# Patient Record
Sex: FEMALE | Race: WHITE | NOT HISPANIC OR LATINO
[De-identification: names, ages, dates, MRNs, and addresses within clinical notes are randomized per-mention and may not be internally consistent; named-entity substitution may affect disease eponyms.]

---

## 2019-02-19 ENCOUNTER — TRANSCRIPTION ENCOUNTER (OUTPATIENT)
Age: 29
End: 2019-02-19

## 2019-05-28 ENCOUNTER — LABORATORY RESULT (OUTPATIENT)
Age: 29
End: 2019-05-28

## 2019-05-28 ENCOUNTER — APPOINTMENT (OUTPATIENT)
Dept: PULMONOLOGY | Facility: CLINIC | Age: 29
End: 2019-05-28
Payer: COMMERCIAL

## 2019-05-28 VITALS
BODY MASS INDEX: 22.47 KG/M2 | WEIGHT: 119 LBS | HEIGHT: 61 IN | SYSTOLIC BLOOD PRESSURE: 126 MMHG | TEMPERATURE: 98.1 F | DIASTOLIC BLOOD PRESSURE: 70 MMHG | HEART RATE: 48 BPM | RESPIRATION RATE: 16 BRPM | OXYGEN SATURATION: 99 %

## 2019-05-28 PROCEDURE — 99395 PREV VISIT EST AGE 18-39: CPT | Mod: 25

## 2019-05-28 PROCEDURE — 36415 COLL VENOUS BLD VENIPUNCTURE: CPT

## 2019-05-28 RX ORDER — NORETHINDRONE ACETATE/ETHINYL ESTRADIOL 1.5-0.03MG
1.5-3 KIT ORAL
Refills: 0 | Status: ACTIVE | COMMUNITY
Start: 2019-05-28

## 2019-05-28 RX ORDER — FAMOTIDINE 10 MG/1
10 TABLET, FILM COATED ORAL
Refills: 0 | Status: ACTIVE | COMMUNITY
Start: 2019-05-28

## 2019-05-28 NOTE — PHYSICAL EXAM
[General Appearance - Well Developed] : well developed [General Appearance - Well Nourished] : well nourished [Normal Conjunctiva] : the conjunctiva exhibited no abnormalities [Normal Oropharynx] : normal oropharynx [Jugular Venous Distention Increased] : there was no jugular-venous distention [Thyroid Diffuse Enlargement] : the thyroid was not enlarged [Heart Sounds] : normal S1 and S2 [Murmurs] : no murmurs present [Abdomen Soft] : soft [Abdomen Tenderness] : non-tender [Nail Clubbing] : no clubbing of the fingernails [Cyanosis, Localized] : no localized cyanosis [Skin Color & Pigmentation] : normal skin color and pigmentation [] : no rash [No Venous Stasis] : no venous stasis [Skin Lesions] : no skin lesions [No Skin Ulcers] : no skin ulcer [Deep Tendon Reflexes (DTR)] : deep tendon reflexes were 2+ and symmetric [No Xanthoma] : no  xanthoma was observed [Sensation] : the sensory exam was normal to light touch and pinprick [No Focal Deficits] : no focal deficits

## 2019-05-29 ENCOUNTER — OTHER (OUTPATIENT)
Age: 29
End: 2019-05-29

## 2019-05-29 LAB
ALBUMIN SERPL ELPH-MCNC: 4.3 G/DL
ALP BLD-CCNC: 50 U/L
ALT SERPL-CCNC: 18 U/L
ANION GAP SERPL CALC-SCNC: 10 MMOL/L
AST SERPL-CCNC: 19 U/L
BASOPHILS # BLD AUTO: 0.03 K/UL
BASOPHILS NFR BLD AUTO: 0.4 %
BILIRUB DIRECT SERPL-MCNC: 0.1 MG/DL
BILIRUB INDIRECT SERPL-MCNC: 0.2 MG/DL
BILIRUB SERPL-MCNC: 0.3 MG/DL
BUN SERPL-MCNC: 13 MG/DL
CALCIUM SERPL-MCNC: 9.6 MG/DL
CHLORIDE SERPL-SCNC: 103 MMOL/L
CHOLEST SERPL-MCNC: 141 MG/DL
CHOLEST/HDLC SERPL: 3 RATIO
CO2 SERPL-SCNC: 24 MMOL/L
CREAT SERPL-MCNC: 0.77 MG/DL
EOSINOPHIL # BLD AUTO: 0.09 K/UL
EOSINOPHIL NFR BLD AUTO: 1.3 %
ESTIMATED AVERAGE GLUCOSE: 105 MG/DL
GLUCOSE SERPL-MCNC: 90 MG/DL
HBA1C MFR BLD HPLC: 5.3 %
HCT VFR BLD CALC: 36.7 %
HDLC SERPL-MCNC: 47 MG/DL
HGB BLD-MCNC: 11.3 G/DL
IMM GRANULOCYTES NFR BLD AUTO: 0.3 %
LDLC SERPL CALC-MCNC: 78 MG/DL
LYMPHOCYTES # BLD AUTO: 2.26 K/UL
LYMPHOCYTES NFR BLD AUTO: 31.9 %
MAN DIFF?: NORMAL
MCHC RBC-ENTMCNC: 27.4 PG
MCHC RBC-ENTMCNC: 30.8 GM/DL
MCV RBC AUTO: 89.1 FL
MONOCYTES # BLD AUTO: 0.42 K/UL
MONOCYTES NFR BLD AUTO: 5.9 %
NEUTROPHILS # BLD AUTO: 4.27 K/UL
NEUTROPHILS NFR BLD AUTO: 60.2 %
PLATELET # BLD AUTO: 180 K/UL
POTASSIUM SERPL-SCNC: 3.6 MMOL/L
PROT SERPL-MCNC: 7.1 G/DL
RBC # BLD: 4.12 M/UL
RBC # FLD: 12.6 %
SODIUM SERPL-SCNC: 137 MMOL/L
T3 SERPL-MCNC: 152 NG/DL
T3RU NFR SERPL: 1.1 TBI
T4 FREE SERPL-MCNC: 1.2 NG/DL
T4 SERPL-MCNC: 9.6 UG/DL
TRIGL SERPL-MCNC: 78 MG/DL
TSH SERPL-ACNC: 1.79 UIU/ML
WBC # FLD AUTO: 7.09 K/UL

## 2019-07-02 ENCOUNTER — NON-APPOINTMENT (OUTPATIENT)
Age: 29
End: 2019-07-02

## 2019-07-02 ENCOUNTER — APPOINTMENT (OUTPATIENT)
Dept: PULMONOLOGY | Facility: CLINIC | Age: 29
End: 2019-07-02
Payer: COMMERCIAL

## 2019-07-02 VITALS
HEART RATE: 48 BPM | WEIGHT: 119 LBS | RESPIRATION RATE: 16 BRPM | BODY MASS INDEX: 22.47 KG/M2 | DIASTOLIC BLOOD PRESSURE: 73 MMHG | TEMPERATURE: 98.6 F | OXYGEN SATURATION: 99 % | HEIGHT: 61 IN | SYSTOLIC BLOOD PRESSURE: 115 MMHG

## 2019-07-02 PROCEDURE — 36415 COLL VENOUS BLD VENIPUNCTURE: CPT

## 2019-07-02 PROCEDURE — 93000 ELECTROCARDIOGRAM COMPLETE: CPT

## 2019-07-02 PROCEDURE — 99213 OFFICE O/P EST LOW 20 MIN: CPT | Mod: 25

## 2019-07-02 NOTE — ASSESSMENT
[FreeTextEntry1] : Highly likely vasovagal syncope.\par \par Observation\par \par Maintain hydration

## 2019-07-02 NOTE — HISTORY OF PRESENT ILLNESS
[FreeTextEntry1] : Had a episode of fainting while having alterations of wedding gown, felt like a vasovagal \par Has had vasovagal syncope in past. \par Now feels well. \par \par Exercises regularly without problem

## 2019-07-02 NOTE — PHYSICAL EXAM
[General Appearance - Well Developed] : well developed [General Appearance - Well Nourished] : well nourished [Normal Conjunctiva] : the conjunctiva exhibited no abnormalities [Normal Oropharynx] : normal oropharynx [Jugular Venous Distention Increased] : there was no jugular-venous distention [Thyroid Diffuse Enlargement] : the thyroid was not enlarged [Heart Sounds] : normal S1 and S2 [Murmurs] : no murmurs present [Abdomen Soft] : soft [Abdomen Tenderness] : non-tender [] : no hepato-splenomegaly [Nail Clubbing] : no clubbing of the fingernails [Cyanosis, Localized] : no localized cyanosis [Deep Tendon Reflexes (DTR)] : deep tendon reflexes were 2+ and symmetric [Sensation] : the sensory exam was normal to light touch and pinprick [No Focal Deficits] : no focal deficits

## 2019-07-03 LAB
BASOPHILS # BLD AUTO: 0.02 K/UL
BASOPHILS NFR BLD AUTO: 0.3 %
EOSINOPHIL # BLD AUTO: 0.1 K/UL
EOSINOPHIL NFR BLD AUTO: 1.4 %
FERRITIN SERPL-MCNC: 14 NG/ML
FOLATE SERPL-MCNC: 11.3 NG/ML
HCT VFR BLD CALC: 36.8 %
HGB BLD-MCNC: 11.4 G/DL
IMM GRANULOCYTES NFR BLD AUTO: 0.1 %
IRON SATN MFR SERPL: 15 %
IRON SERPL-MCNC: 72 UG/DL
LYMPHOCYTES # BLD AUTO: 2.27 K/UL
LYMPHOCYTES NFR BLD AUTO: 32.3 %
MAN DIFF?: NORMAL
MCHC RBC-ENTMCNC: 27.7 PG
MCHC RBC-ENTMCNC: 31 GM/DL
MCV RBC AUTO: 89.3 FL
MONOCYTES # BLD AUTO: 0.38 K/UL
MONOCYTES NFR BLD AUTO: 5.4 %
NEUTROPHILS # BLD AUTO: 4.25 K/UL
NEUTROPHILS NFR BLD AUTO: 60.5 %
PLATELET # BLD AUTO: 184 K/UL
RBC # BLD: 4.12 M/UL
RBC # FLD: 13.1 %
TIBC SERPL-MCNC: 469 UG/DL
UIBC SERPL-MCNC: 397 UG/DL
VIT B12 SERPL-MCNC: 462 PG/ML
WBC # FLD AUTO: 7.03 K/UL

## 2020-06-09 ENCOUNTER — TRANSCRIPTION ENCOUNTER (OUTPATIENT)
Age: 30
End: 2020-06-09

## 2020-06-22 ENCOUNTER — TRANSCRIPTION ENCOUNTER (OUTPATIENT)
Age: 30
End: 2020-06-22

## 2020-07-20 ENCOUNTER — APPOINTMENT (OUTPATIENT)
Dept: PULMONOLOGY | Facility: CLINIC | Age: 30
End: 2020-07-20
Payer: COMMERCIAL

## 2020-07-20 VITALS
OXYGEN SATURATION: 98 % | SYSTOLIC BLOOD PRESSURE: 109 MMHG | HEART RATE: 87 BPM | TEMPERATURE: 98.7 F | DIASTOLIC BLOOD PRESSURE: 74 MMHG

## 2020-07-20 PROCEDURE — 99214 OFFICE O/P EST MOD 30 MIN: CPT | Mod: 25

## 2020-07-20 PROCEDURE — 36415 COLL VENOUS BLD VENIPUNCTURE: CPT

## 2020-07-20 NOTE — PHYSICAL EXAM
[No Acute Distress] : no acute distress [Normal Oropharynx] : normal oropharynx [Supple] : supple [Normal Appearance] : normal appearance [No Neck Mass] : no neck mass [No JVD] : no jvd [Normal S1, S2] : normal s1, s2 [No Murmurs] : no murmurs [Clear to Auscultation Bilaterally] : clear to auscultation bilaterally [No Abnormalities] : no abnormalities [Benign] : benign [No Clubbing] : no clubbing [Normal to Percussion] : normal to percussion [No Edema] : no edema

## 2020-07-21 LAB
ALBUMIN SERPL ELPH-MCNC: 4.7 G/DL
ALP BLD-CCNC: 68 U/L
ALT SERPL-CCNC: 28 U/L
ANION GAP SERPL CALC-SCNC: 14 MMOL/L
APTT BLD: 33.6 SEC
AST SERPL-CCNC: 25 U/L
BASOPHILS # BLD AUTO: 0.03 K/UL
BASOPHILS NFR BLD AUTO: 0.5 %
BILIRUB SERPL-MCNC: 0.4 MG/DL
BUN SERPL-MCNC: 12 MG/DL
CALCIUM SERPL-MCNC: 9.5 MG/DL
CHLORIDE SERPL-SCNC: 103 MMOL/L
CO2 SERPL-SCNC: 22 MMOL/L
CREAT SERPL-MCNC: 0.86 MG/DL
EOSINOPHIL # BLD AUTO: 0.06 K/UL
EOSINOPHIL NFR BLD AUTO: 0.9 %
GLUCOSE SERPL-MCNC: 88 MG/DL
HCT VFR BLD CALC: 36.1 %
HGB BLD-MCNC: 11.2 G/DL
IMM GRANULOCYTES NFR BLD AUTO: 0 %
INR PPP: 1.01 RATIO
LYMPHOCYTES # BLD AUTO: 1.92 K/UL
LYMPHOCYTES NFR BLD AUTO: 29.7 %
MAN DIFF?: NORMAL
MCHC RBC-ENTMCNC: 28.3 PG
MCHC RBC-ENTMCNC: 31 GM/DL
MCV RBC AUTO: 91.2 FL
MONOCYTES # BLD AUTO: 0.36 K/UL
MONOCYTES NFR BLD AUTO: 5.6 %
NEUTROPHILS # BLD AUTO: 4.1 K/UL
NEUTROPHILS NFR BLD AUTO: 63.3 %
PLATELET # BLD AUTO: 218 K/UL
POTASSIUM SERPL-SCNC: 4.1 MMOL/L
PROT SERPL-MCNC: 7.4 G/DL
PT BLD: 12 SEC
RBC # BLD: 3.96 M/UL
RBC # FLD: 13 %
SODIUM SERPL-SCNC: 139 MMOL/L
WBC # FLD AUTO: 6.47 K/UL

## 2020-07-21 NOTE — DISCUSSION/SUMMARY
[FreeTextEntry1] : Anemia.\par GERD.\par BRCA positive.\par Preop for bilateral mastectomy and reconstruction.

## 2020-07-21 NOTE — ASSESSMENT
[FreeTextEntry1] : Medical problems as above. Medical status maximized. No medical contraindications to surgery.\par \par Labs drawn in office today\par

## 2020-07-21 NOTE — HISTORY OF PRESENT ILLNESS
[TextBox_4] : Pt feeling well. \par \par No complaints.\par \par For  bilateral mastectomy 8/11/20  Dr. Jessica Heller.

## 2020-11-03 ENCOUNTER — NON-APPOINTMENT (OUTPATIENT)
Age: 30
End: 2020-11-03

## 2020-11-03 ENCOUNTER — APPOINTMENT (OUTPATIENT)
Dept: PULMONOLOGY | Facility: CLINIC | Age: 30
End: 2020-11-03

## 2020-11-03 ENCOUNTER — LABORATORY RESULT (OUTPATIENT)
Age: 30
End: 2020-11-03

## 2020-11-03 ENCOUNTER — APPOINTMENT (OUTPATIENT)
Dept: PULMONOLOGY | Facility: CLINIC | Age: 30
End: 2020-11-03
Payer: COMMERCIAL

## 2020-11-03 VITALS
HEIGHT: 61 IN | WEIGHT: 127 LBS | DIASTOLIC BLOOD PRESSURE: 88 MMHG | TEMPERATURE: 98 F | BODY MASS INDEX: 23.98 KG/M2 | SYSTOLIC BLOOD PRESSURE: 132 MMHG | OXYGEN SATURATION: 99 % | HEART RATE: 102 BPM

## 2020-11-03 VITALS — DIASTOLIC BLOOD PRESSURE: 80 MMHG | SYSTOLIC BLOOD PRESSURE: 124 MMHG

## 2020-11-03 DIAGNOSIS — D64.9 ANEMIA, UNSPECIFIED: ICD-10-CM

## 2020-11-03 DIAGNOSIS — Z15.09 GENETIC SUSCEPTIBILITY TO MALIGNANT NEOPLASM OF BREAST: ICD-10-CM

## 2020-11-03 DIAGNOSIS — Z00.00 ENCOUNTER FOR GENERAL ADULT MEDICAL EXAMINATION W/OUT ABNORMAL FINDINGS: ICD-10-CM

## 2020-11-03 DIAGNOSIS — Z90.89 ACQUIRED ABSENCE OF OTHER ORGANS: ICD-10-CM

## 2020-11-03 DIAGNOSIS — Z23 ENCOUNTER FOR IMMUNIZATION: ICD-10-CM

## 2020-11-03 DIAGNOSIS — R01.1 CARDIAC MURMUR, UNSPECIFIED: ICD-10-CM

## 2020-11-03 DIAGNOSIS — Z15.01 GENETIC SUSCEPTIBILITY TO MALIGNANT NEOPLASM OF BREAST: ICD-10-CM

## 2020-11-03 PROCEDURE — 99072 ADDL SUPL MATRL&STAF TM PHE: CPT

## 2020-11-03 PROCEDURE — 99395 PREV VISIT EST AGE 18-39: CPT | Mod: 25

## 2020-11-03 PROCEDURE — 36415 COLL VENOUS BLD VENIPUNCTURE: CPT

## 2020-11-03 PROCEDURE — 90686 IIV4 VACC NO PRSV 0.5 ML IM: CPT

## 2020-11-03 PROCEDURE — G0008: CPT

## 2020-11-03 PROCEDURE — 93000 ELECTROCARDIOGRAM COMPLETE: CPT

## 2020-11-03 RX ORDER — CABERGOLINE 0.5 MG/1
0.5 TABLET ORAL
Qty: 8 | Refills: 0 | Status: DISCONTINUED | COMMUNITY
Start: 2020-06-26

## 2020-11-03 RX ORDER — FAMOTIDINE 20 MG/1
20 TABLET, FILM COATED ORAL
Qty: 60 | Refills: 0 | Status: DISCONTINUED | COMMUNITY
Start: 2020-05-29

## 2020-11-03 NOTE — PHYSICAL EXAM
[Supple] : supple [No JVD] : no jvd [Normal S1, S2] : normal s1, s2 [Murmur ___ / 6] : murmur [unfilled] / 6 [Clear to Auscultation Bilaterally] : clear to auscultation bilaterally [Normal to Percussion] : normal to percussion [Benign] : benign [No HSM] : no hsm [No Clubbing] : no clubbing [No Cyanosis] : no cyanosis [No Edema] : no edema

## 2020-11-04 PROBLEM — R01.1 HEART MURMUR: Status: ACTIVE | Noted: 2020-11-04

## 2020-11-04 LAB
25(OH)D3 SERPL-MCNC: 24.1 NG/ML
ALBUMIN SERPL ELPH-MCNC: 4.4 G/DL
ALP BLD-CCNC: 67 U/L
ALT SERPL-CCNC: 8 U/L
ANION GAP SERPL CALC-SCNC: 10 MMOL/L
AST SERPL-CCNC: 13 U/L
BASOPHILS # BLD AUTO: 0.01 K/UL
BASOPHILS NFR BLD AUTO: 0.2 %
BILIRUB DIRECT SERPL-MCNC: 0.1 MG/DL
BILIRUB INDIRECT SERPL-MCNC: NORMAL MG/DL
BILIRUB SERPL-MCNC: 0.2 MG/DL
BUN SERPL-MCNC: 7 MG/DL
CALCIUM SERPL-MCNC: 9.4 MG/DL
CHLORIDE SERPL-SCNC: 104 MMOL/L
CHOLEST SERPL-MCNC: 140 MG/DL
CO2 SERPL-SCNC: 22 MMOL/L
CREAT SERPL-MCNC: 0.63 MG/DL
EOSINOPHIL # BLD AUTO: 0.07 K/UL
EOSINOPHIL NFR BLD AUTO: 1.1 %
ESTIMATED AVERAGE GLUCOSE: 111 MG/DL
FERRITIN SERPL-MCNC: 12 NG/ML
FOLATE SERPL-MCNC: 9.4 NG/ML
GLUCOSE SERPL-MCNC: 95 MG/DL
HBA1C MFR BLD HPLC: 5.5 %
HCT VFR BLD CALC: 31.6 %
HCV AB SER QL: NONREACTIVE
HCV S/CO RATIO: 0.09 S/CO
HDLC SERPL-MCNC: 50 MG/DL
HGB BLD-MCNC: 9.7 G/DL
IMM GRANULOCYTES NFR BLD AUTO: 0.2 %
IRON SATN MFR SERPL: 5 %
IRON SERPL-MCNC: 23 UG/DL
LDLC SERPL CALC-MCNC: 77 MG/DL
LYMPHOCYTES # BLD AUTO: 2.26 K/UL
LYMPHOCYTES NFR BLD AUTO: 34.9 %
MAN DIFF?: NORMAL
MCHC RBC-ENTMCNC: 25.9 PG
MCHC RBC-ENTMCNC: 30.7 GM/DL
MCV RBC AUTO: 84.5 FL
MONOCYTES # BLD AUTO: 0.54 K/UL
MONOCYTES NFR BLD AUTO: 8.3 %
NEUTROPHILS # BLD AUTO: 3.59 K/UL
NEUTROPHILS NFR BLD AUTO: 55.3 %
NONHDLC SERPL-MCNC: 91 MG/DL
PLATELET # BLD AUTO: 287 K/UL
POTASSIUM SERPL-SCNC: 4.3 MMOL/L
PROT SERPL-MCNC: 7.2 G/DL
RBC # BLD: 3.74 M/UL
RBC # FLD: 13.9 %
SODIUM SERPL-SCNC: 136 MMOL/L
T3 SERPL-MCNC: 190 NG/DL
T3RU NFR SERPL: 1.3 TBI
T4 FREE SERPL-MCNC: 1.1 NG/DL
T4 SERPL-MCNC: 9.4 UG/DL
THYROGLOB AB SERPL-ACNC: 20.7 IU/ML
THYROPEROXIDASE AB SERPL IA-ACNC: <10 IU/ML
TIBC SERPL-MCNC: 486 UG/DL
TRIGL SERPL-MCNC: 66 MG/DL
TSH SERPL-ACNC: 2.01 UIU/ML
UIBC SERPL-MCNC: 463 UG/DL
VIT B12 SERPL-MCNC: 739 PG/ML
WBC # FLD AUTO: 6.48 K/UL

## 2020-11-04 NOTE — DISCUSSION/SUMMARY
[FreeTextEntry1] : Anemia.\par GERD.\par BRCA positive.\par Preop for bilateral mastectomy and reconstruction.\par Mild systolic murmur.  May be functional.

## 2020-11-04 NOTE — HISTORY OF PRESENT ILLNESS
[Never] : never [TextBox_4] : had embryo frozen and was told thyroid  TSH was high 3.88\par \par This post bilateral mastectomies.  Getting expanders removed and implants in December\par seeing gi in few weeks notice blood with hard stools\par \par noticed today heart rate elevate with i watch\par \par Feeling generally well.

## 2020-11-05 ENCOUNTER — TRANSCRIPTION ENCOUNTER (OUTPATIENT)
Age: 30
End: 2020-11-05

## 2020-12-14 ENCOUNTER — RESULT REVIEW (OUTPATIENT)
Age: 30
End: 2020-12-14

## 2021-04-14 ENCOUNTER — TRANSCRIPTION ENCOUNTER (OUTPATIENT)
Age: 31
End: 2021-04-14

## 2021-05-07 ENCOUNTER — TRANSCRIPTION ENCOUNTER (OUTPATIENT)
Age: 31
End: 2021-05-07

## 2021-08-25 ENCOUNTER — TRANSCRIPTION ENCOUNTER (OUTPATIENT)
Age: 31
End: 2021-08-25

## 2021-09-28 ENCOUNTER — TRANSCRIPTION ENCOUNTER (OUTPATIENT)
Age: 31
End: 2021-09-28

## 2021-10-01 ENCOUNTER — TRANSCRIPTION ENCOUNTER (OUTPATIENT)
Age: 31
End: 2021-10-01

## 2021-10-21 ENCOUNTER — TRANSCRIPTION ENCOUNTER (OUTPATIENT)
Age: 31
End: 2021-10-21

## 2021-11-16 ENCOUNTER — TRANSCRIPTION ENCOUNTER (OUTPATIENT)
Age: 31
End: 2021-11-16

## 2021-11-22 ENCOUNTER — APPOINTMENT (OUTPATIENT)
Dept: PULMONOLOGY | Facility: CLINIC | Age: 31
End: 2021-11-22

## 2021-11-25 ENCOUNTER — EMERGENCY (EMERGENCY)
Facility: HOSPITAL | Age: 31
LOS: 1 days | Discharge: HOME | End: 2021-11-25
Attending: EMERGENCY MEDICINE
Payer: COMMERCIAL

## 2021-11-25 VITALS
DIASTOLIC BLOOD PRESSURE: 87 MMHG | HEART RATE: 80 BPM | RESPIRATION RATE: 15 BRPM | TEMPERATURE: 98 F | OXYGEN SATURATION: 98 % | SYSTOLIC BLOOD PRESSURE: 136 MMHG

## 2021-11-25 VITALS
HEIGHT: 61 IN | WEIGHT: 130.07 LBS | HEART RATE: 94 BPM | RESPIRATION RATE: 16 BRPM | OXYGEN SATURATION: 100 % | TEMPERATURE: 98 F | DIASTOLIC BLOOD PRESSURE: 91 MMHG | SYSTOLIC BLOOD PRESSURE: 134 MMHG

## 2021-11-25 PROCEDURE — 99284 EMERGENCY DEPT VISIT MOD MDM: CPT | Mod: 25

## 2021-11-25 PROCEDURE — 99284 EMERGENCY DEPT VISIT MOD MDM: CPT

## 2021-11-25 PROCEDURE — 71250 CT THORAX DX C-: CPT | Mod: 26,MA

## 2021-11-25 PROCEDURE — 71250 CT THORAX DX C-: CPT | Mod: MA

## 2021-11-25 RX ORDER — ACETAMINOPHEN 500 MG
975 TABLET ORAL ONCE
Refills: 0 | Status: DISCONTINUED | OUTPATIENT
Start: 2021-11-25 | End: 2021-11-29
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
## 2021-11-25 NOTE — ED PROVIDER NOTE - MUSCULOSKELETAL MINIMAL EXAM
+ttp under left breast. +ttp to left inguinal area. full ROM present. strength 5/5 of both upper and lower extremities.

## 2021-11-25 NOTE — ED PROVIDER NOTE - DISPOSITION TYPE
IV insertion

IV access obtained, via clean sterile technique by inserting 22 gauge catheter at left 
forearm after 2 attempt(s). IV secured properly. No trauma to site. Patient tolerated well. DISCHARGE

## 2021-11-25 NOTE — ED ADULT NURSE NOTE - CHPI ED NUR SYMPTOMS NEG
no acting out behaviors/no back pain/no bruising/no decreased eating/drinking/no difficulty bearing weight/no disorientation/no dizziness/no headache/no loss of consciousness/no neck tenderness
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
## 2021-11-25 NOTE — ED PROVIDER NOTE - PATIENT PORTAL LINK FT
You can access the FollowMyHealth Patient Portal offered by [facility] by registering at the following website: [email or web address]. By joining [facility]’s FollowMyHealth portal, you will also be able to view your health information using other applications (apps) compatible with our system.

## 2021-11-25 NOTE — ED ADULT NURSE NOTE - NSIMPLEMENTINTERV_GEN_ALL_ED
Implemented All Universal Safety Interventions:  [place] to call system. Call bell, personal items and telephone within reach. Instruct patient to call for assistance. Room bathroom lighting operational. Non-slip footwear when patient is off stretcher. Physically safe environment: no spills, clutter or unnecessary equipment. Stretcher in lowest position, wheels locked, appropriate side rails in place.

## 2021-12-27 ENCOUNTER — TRANSCRIPTION ENCOUNTER (OUTPATIENT)
Age: 31
End: 2021-12-27

## 2022-01-08 ENCOUNTER — TRANSCRIPTION ENCOUNTER (OUTPATIENT)
Age: 32
End: 2022-01-08

## 2022-01-18 ENCOUNTER — APPOINTMENT (OUTPATIENT)
Dept: PULMONOLOGY | Facility: CLINIC | Age: 32
End: 2022-01-18

## 2022-02-09 ENCOUNTER — TRANSCRIPTION ENCOUNTER (OUTPATIENT)
Age: 32
End: 2022-02-09

## 2022-03-01 ENCOUNTER — APPOINTMENT (OUTPATIENT)
Dept: GASTROENTEROLOGY | Facility: CLINIC | Age: 32
End: 2022-03-01
Payer: COMMERCIAL

## 2022-03-01 ENCOUNTER — NON-APPOINTMENT (OUTPATIENT)
Age: 32
End: 2022-03-01

## 2022-03-01 VITALS
HEART RATE: 95 BPM | TEMPERATURE: 97.6 F | WEIGHT: 135 LBS | SYSTOLIC BLOOD PRESSURE: 110 MMHG | DIASTOLIC BLOOD PRESSURE: 70 MMHG | BODY MASS INDEX: 25.49 KG/M2 | OXYGEN SATURATION: 99 % | HEIGHT: 61 IN

## 2022-03-01 DIAGNOSIS — Z80.41 FAMILY HISTORY OF MALIGNANT NEOPLASM OF OVARY: ICD-10-CM

## 2022-03-01 DIAGNOSIS — Q39.8 OTHER CONGENITAL MALFORMATIONS OF ESOPHAGUS: ICD-10-CM

## 2022-03-01 DIAGNOSIS — Z78.9 OTHER SPECIFIED HEALTH STATUS: ICD-10-CM

## 2022-03-01 DIAGNOSIS — Z80.3 FAMILY HISTORY OF MALIGNANT NEOPLASM OF BREAST: ICD-10-CM

## 2022-03-01 DIAGNOSIS — Z84.81 FAMILY HISTORY OF CARRIER OF GENETIC DISEASE: ICD-10-CM

## 2022-03-01 PROCEDURE — 99204 OFFICE O/P NEW MOD 45 MIN: CPT

## 2022-03-01 RX ORDER — CLOTRIMAZOLE 10 MG/G
1 CREAM TOPICAL
Qty: 15 | Refills: 0 | Status: ACTIVE | COMMUNITY
Start: 2021-11-11

## 2022-03-01 RX ORDER — MUPIROCIN 20 MG/G
2 OINTMENT TOPICAL
Qty: 22 | Refills: 0 | Status: ACTIVE | COMMUNITY
Start: 2021-11-11

## 2022-03-01 RX ORDER — RABEPRAZOLE SODIUM 20 MG/1
20 TABLET, DELAYED RELEASE ORAL
Qty: 180 | Refills: 0 | Status: ACTIVE | COMMUNITY
Start: 2022-02-03

## 2022-03-01 RX ORDER — HYDROCORTISONE 25 MG/ML
2.5 LOTION TOPICAL
Qty: 59 | Refills: 0 | Status: ACTIVE | COMMUNITY
Start: 2021-11-26

## 2022-03-01 NOTE — HISTORY OF PRESENT ILLNESS
[FreeTextEntry1] : 30 yo F pmh BRCA with fam h/o ovarian, breast cancers s/p b/l mastectomy who presents for 2nd opinion re: GERD despite therapy.\par \par GERD:\par Started 10 years ago with dietary issues in college - etoh, trigger foods\par Has been going on for years and had progressed\par It has worsened over the past few years - specifically harder to control with meds\par More trigger foods then in the past, there is significant amount of symptoms \par No dysphagia, odynophagia, n/v\par \par If she were off meds - she would have 100% symptoms daily\par Felt horrible during ph Study\par \par Ultimately saw multiple MDs - EGD, GES, pH study off therapy, manometry\par \par Since e-mano and pH study:\par - Put on Rabeprazole BID + Pepcid\par - Did not get better 100% - Burning is 100% better; 50% on other symptoms\par - Still feels like liquid coming up (not regurgitation), still with belching\par - Did not have scleroderma\par \par \par -------------------------------------------------\par Prior Workup:\par \par E-mano 7/2021 - Yokasta Jones\par Ineffective Esophageal motility - 60% failed, 40% weak\par IRP 5.9\par 100% incomplete bolus clearance\par Ineffective uprights\par Questionable adequate peristaltic reserve\par \par pH Impedance OFF therapy\par AET 12.6%\par 195 total reflux episodes\par + Heartburn SAP/SI correlation\par Positive for GERD\par \par EGD 12/2020 - Marcy Menjivar\par Normal Endoscopy other than some irritation\par \par GES - 2017 - Normal\par Repeat\par \par EGD 2017\par Grade A esophagitis\par \par Colon 2017:\par No polyps found\par \par VCE 2017:\par Normal\par \par \par CT Chest 2021:\par PROCEDURE:\par CT of the Chest was performed.\par Sagittal and coronal reformats were performed.\par \par FINDINGS:\par \par LUNGS AND AIRWAYS: Patent central airways. Lungs are clear.\par PLEURA: No pleural effusion.\par MEDIASTINUM AND JANA: No lymphadenopathy.\par VESSELS: Within normal limits.\par HEART: Heart size is normal. No pericardial effusion.\par CHEST WALL AND LOWER NECK: Bilateral retropectoral implants.\par VISUALIZED UPPER ABDOMEN: Subcentimeter hypoattenuating lesion in the dome of the liver, too small to characterize accurately.\par BONES: Within normal limits. No acute fractures\par \par IMPRESSION:\par No acute thoracic pathology.\par \par \par Pulm 2020\par Encounter for preventive health examination (V70.0) (Z00.00)\par BRCA1 gene mutation positive (V84.01) (Z15.01,Z15.09)\par History of bilateral mastoidectomy (V45.89) (Z90.89)\par Encounter for vaccination (V05.9) (Z23)\par Anemia (285.9) (D64.9)\par \par Labs drawn in office today\par GI evaluation.\par Recheck thyroid function.

## 2022-03-01 NOTE — ASSESSMENT
[FreeTextEntry1] : 32 yo F pmh BRCA with fam h/o ovarian, breast cancers s/p b/l mastectomy who presents for 2nd opinion re: GERD despite therapy. She has IEM with a positive pH study and excellent symptom correlation OFF therapy.  However with persistent symptoms when on.  Next step is repeat mano and pH testing ON therapy.  Will r/o scleroderma via labs as well given frequent aperistalsis on study.   Pending these testing - consideration for promotility agents and/or treatment of functional overlap can be considered.  If persistent reflux - surgical options are likely to be of benefit.\par \par Impression:\par 1) GERD despite therapy\par 2) IEM with frequent aperistalsis\par 3) BRCA with fam h/o ovarian, breast cancers\par 4) s/p Mastectomy\par \par Plan:\par 1) Emano + 24 hour pH study ON THERAPY\par 2) Pending above to consider further treatments for IEM\par 3) Scleroderma labs\par 4) Repeat GES (she is already scheduled)\par 5) Extensive review of multiple old tests with patient.  Many questions answered.  Plan agreed upon\par 6) RV pending above

## 2022-03-01 NOTE — PHYSICAL EXAM
[General Appearance - Alert] : alert [General Appearance - In No Acute Distress] : in no acute distress [General Appearance - Well Nourished] : well nourished [Sclera] : the sclera and conjunctiva were normal [Extraocular Movements] : extraocular movements were intact [Outer Ear] : the ears and nose were normal in appearance [Hearing Threshold Finger Rub Not [place]] : hearing was normal [Neck Appearance] : the appearance of the neck was normal [Neck Cervical Mass (___cm)] : no neck mass was observed [Exaggerated Use Of Accessory Muscles For Inspiration] : no accessory muscle use [Respiration, Rhythm And Depth] : normal respiratory rhythm and effort [Auscultation Breath Sounds / Voice Sounds] : lungs were clear to auscultation bilaterally [Heart Rate And Rhythm] : heart rate was normal and rhythm regular [Heart Sounds] : normal S1 and S2 [Murmurs] : no murmurs [Bowel Sounds] : normal bowel sounds [Abdomen Soft] : soft [Abdomen Tenderness] : non-tender [Abdomen Mass (___ Cm)] : no abdominal mass palpated [Cervical Lymph Nodes Enlarged Posterior Bilaterally] : posterior cervical [Cervical Lymph Nodes Enlarged Anterior Bilaterally] : anterior cervical [Supraclavicular Lymph Nodes Enlarged Bilaterally] : supraclavicular [No CVA Tenderness] : no ~M costovertebral angle tenderness [No Spinal Tenderness] : no spinal tenderness [Abnormal Walk] : normal gait [Involuntary Movements] : no involuntary movements were seen [] : no rash [Skin Lesions] : no skin lesions [No Focal Deficits] : no focal deficits [FreeTextEntry1] : aox3 [Impaired Insight] : insight and judgment were intact [Affect] : the affect was normal

## 2022-03-02 ENCOUNTER — TRANSCRIPTION ENCOUNTER (OUTPATIENT)
Age: 32
End: 2022-03-02

## 2022-03-02 PROBLEM — Z90.13 ACQUIRED ABSENCE OF BILATERAL BREASTS AND NIPPLES: Chronic | Status: ACTIVE | Noted: 2021-11-25

## 2022-03-04 ENCOUNTER — TRANSCRIPTION ENCOUNTER (OUTPATIENT)
Age: 32
End: 2022-03-04

## 2022-03-07 ENCOUNTER — NON-APPOINTMENT (OUTPATIENT)
Age: 32
End: 2022-03-07

## 2022-03-22 ENCOUNTER — TRANSCRIPTION ENCOUNTER (OUTPATIENT)
Age: 32
End: 2022-03-22

## 2022-03-23 ENCOUNTER — TRANSCRIPTION ENCOUNTER (OUTPATIENT)
Age: 32
End: 2022-03-23

## 2022-03-23 LAB
ALBUMIN SERPL ELPH-MCNC: 4.4 G/DL
ALP BLD-CCNC: 75 U/L
ALT SERPL-CCNC: 13 U/L
ANION GAP SERPL CALC-SCNC: 12 MMOL/L
AST SERPL-CCNC: 17 U/L
BASOPHILS # BLD AUTO: 0.02 K/UL
BASOPHILS NFR BLD AUTO: 0.3 %
BILIRUB SERPL-MCNC: 0.2 MG/DL
BUN SERPL-MCNC: 10 MG/DL
CALCIUM SERPL-MCNC: 9.2 MG/DL
CHLORIDE SERPL-SCNC: 105 MMOL/L
CO2 SERPL-SCNC: 21 MMOL/L
CREAT SERPL-MCNC: 0.75 MG/DL
EGFR: 110 ML/MIN/1.73M2
EOSINOPHIL # BLD AUTO: 0.06 K/UL
EOSINOPHIL NFR BLD AUTO: 1 %
ERYTHROCYTE [SEDIMENTATION RATE] IN BLOOD BY WESTERGREN METHOD: 29 MM/HR
GLUCOSE SERPL-MCNC: 69 MG/DL
HCT VFR BLD CALC: 34.9 %
HGB BLD-MCNC: 10.8 G/DL
IMM GRANULOCYTES NFR BLD AUTO: 0.3 %
LYMPHOCYTES # BLD AUTO: 2.34 K/UL
LYMPHOCYTES NFR BLD AUTO: 39 %
MAN DIFF?: NORMAL
MCHC RBC-ENTMCNC: 26.9 PG
MCHC RBC-ENTMCNC: 30.9 GM/DL
MCV RBC AUTO: 86.8 FL
MONOCYTES # BLD AUTO: 0.45 K/UL
MONOCYTES NFR BLD AUTO: 7.5 %
NEUTROPHILS # BLD AUTO: 3.11 K/UL
NEUTROPHILS NFR BLD AUTO: 51.9 %
PLATELET # BLD AUTO: 226 K/UL
POTASSIUM SERPL-SCNC: 4.1 MMOL/L
PROT SERPL-MCNC: 7.2 G/DL
RBC # BLD: 4.02 M/UL
RBC # FLD: 12.5 %
RHEUMATOID FACT SER QL: <10 IU/ML
SMOOTH MUSCLE AB SER QL IF: NORMAL
SODIUM SERPL-SCNC: 138 MMOL/L
WBC # FLD AUTO: 6 K/UL

## 2022-03-24 LAB
ANA PAT FLD IF-IMP: ABNORMAL
ANA SER IF-ACNC: ABNORMAL
CENTROMERE IGG SER-ACNC: <0.2 CD:130001892
ENA SCL70 IGG SER IA-ACNC: <0.2 AL

## 2022-04-04 ENCOUNTER — TRANSCRIPTION ENCOUNTER (OUTPATIENT)
Age: 32
End: 2022-04-04

## 2022-04-06 ENCOUNTER — APPOINTMENT (OUTPATIENT)
Dept: GASTROENTEROLOGY | Facility: HOSPITAL | Age: 32
End: 2022-04-06

## 2022-04-15 ENCOUNTER — TRANSCRIPTION ENCOUNTER (OUTPATIENT)
Age: 32
End: 2022-04-15

## 2022-04-20 ENCOUNTER — TRANSCRIPTION ENCOUNTER (OUTPATIENT)
Age: 32
End: 2022-04-20

## 2022-04-27 ENCOUNTER — NON-APPOINTMENT (OUTPATIENT)
Age: 32
End: 2022-04-27

## 2022-04-27 DIAGNOSIS — Z01.812 ENCOUNTER FOR PREPROCEDURAL LABORATORY EXAMINATION: ICD-10-CM

## 2022-05-04 ENCOUNTER — TRANSCRIPTION ENCOUNTER (OUTPATIENT)
Age: 32
End: 2022-05-04

## 2022-05-04 ENCOUNTER — OUTPATIENT (OUTPATIENT)
Dept: OUTPATIENT SERVICES | Facility: HOSPITAL | Age: 32
LOS: 1 days | End: 2022-05-04
Payer: COMMERCIAL

## 2022-05-04 ENCOUNTER — APPOINTMENT (OUTPATIENT)
Dept: GASTROENTEROLOGY | Facility: HOSPITAL | Age: 32
End: 2022-05-04
Payer: COMMERCIAL

## 2022-05-04 VITALS
HEIGHT: 61 IN | SYSTOLIC BLOOD PRESSURE: 147 MMHG | HEART RATE: 91 BPM | RESPIRATION RATE: 16 BRPM | TEMPERATURE: 98 F | DIASTOLIC BLOOD PRESSURE: 82 MMHG | OXYGEN SATURATION: 98 % | WEIGHT: 130.07 LBS

## 2022-05-04 DIAGNOSIS — K21.9 GASTRO-ESOPHAGEAL REFLUX DISEASE WITHOUT ESOPHAGITIS: ICD-10-CM

## 2022-05-04 DIAGNOSIS — K22.4 DYSKINESIA OF ESOPHAGUS: ICD-10-CM

## 2022-05-04 PROCEDURE — 91010 ESOPHAGUS MOTILITY STUDY: CPT

## 2022-05-04 PROCEDURE — 91010 ESOPHAGUS MOTILITY STUDY: CPT | Mod: 26

## 2022-05-04 PROCEDURE — 91037 ESOPH IMPED FUNCTION TEST: CPT | Mod: 26

## 2022-05-04 PROCEDURE — 91037 ESOPH IMPED FUNCTION TEST: CPT

## 2022-05-04 PROCEDURE — C1889: CPT

## 2022-05-04 DEVICE — CATH VERSAFLEX Z PH: Type: IMPLANTABLE DEVICE | Status: FUNCTIONAL

## 2022-05-04 RX ORDER — NORETHINDRONE AND ETHINYL ESTRADIOL 0.4-0.035
1 KIT ORAL
Qty: 0 | Refills: 0 | DISCHARGE

## 2022-05-04 RX ORDER — RABEPRAZOLE 20 MG/1
20 TABLET, DELAYED RELEASE ORAL
Qty: 0 | Refills: 0 | DISCHARGE

## 2022-05-04 NOTE — ASU PATIENT PROFILE, ADULT - NS TRANSFER DISPOSITION PATIENT BELONGINGS
Patient called stating [facility] had a [facility] and he would like this sent to a new pharmacy, [facility] in [place]. Please advise.    with patient

## 2022-05-04 NOTE — PRE PROCEDURE NOTE - PRE PROCEDURE EVALUATION
Attending Physician:              Tano Fernandes MD MSEd  Performing provider:            SANDRA Reynolds  Indication for Procedure:     GERD, IEM                PAST MEDICAL & SURGICAL HISTORY:  History of bilateral mastectomy          See Allscripts Note for further details  ALLERGIES:  No Known Allergies    HOME MEDICATIONS:  Microgestin 1.5/30 oral tablet: 1 tab(s) orally once a day  RABEprazole 20 mg oral tablet, extended release: 20  orally 2 times a day      See Allscripts Note for further details    AICD/PPM: [ ] yes   [X ] no    PERTINENT LAB DATA:                      PHYSICAL EXAMINATION:    Height (cm): 154.9  Weight (kg): 59  BMI (kg/m2): 24.6  BSA (m2): 1.57T(C): --  HR: --  BP: --  RR: --  SpO2: --    Constitutional: NAD  HEENT: PERRLA, EOMI,    Neck:  No JVD  Respiratory: CTAB/L  Cardiovascular: S1 and S2  Gastrointestinal: BS+, soft, NT/ND  Extremities: No peripheral edema  Neurological: A/O x 3, no focal deficits  Psychiatric: Normal mood, normal affect  Skin: No rashes    ASA Class: I [ ]  II [X ]  III [ ]  IV [ ]    COMMENTS:    The patient is a suitable candidate for the planned procedure unless box checked [ ]  No, explain:

## 2022-05-04 NOTE — ASU PATIENT PROFILE, ADULT - FALL HARM RISK - UNIVERSAL INTERVENTIONS
Bed in lowest position, wheels locked, appropriate side rails in place/Call bell, personal items and telephone in reach/Instruct patient to call for assistance before getting out of bed or chair/Non-slip footwear when patient is out of bed/[place] to call system/Physically safe environment - no spills, clutter or unnecessary equipment/Purposeful Proactive Rounding/Room/bathroom lighting operational, light cord in reach

## 2022-05-04 NOTE — ASU DISCHARGE PLAN (ADULT/PEDIATRIC) - NS MD DC FALL RISK RISK
For information on Fall & Injury Prevention, visit: [email or web address] OR  [email or web address] OR  https://www.cdc.gov/steadi/patient.html

## 2022-05-05 ENCOUNTER — NON-APPOINTMENT (OUTPATIENT)
Age: 32
End: 2022-05-05

## 2022-05-05 ENCOUNTER — TRANSCRIPTION ENCOUNTER (OUTPATIENT)
Age: 32
End: 2022-05-05

## 2022-05-05 PROCEDURE — 91038 ESOPH IMPED FUNCT TEST > 1HR: CPT | Mod: 26

## 2022-05-10 ENCOUNTER — TRANSCRIPTION ENCOUNTER (OUTPATIENT)
Age: 32
End: 2022-05-10

## 2022-05-13 ENCOUNTER — TRANSCRIPTION ENCOUNTER (OUTPATIENT)
Age: 32
End: 2022-05-13

## 2022-05-17 ENCOUNTER — NON-APPOINTMENT (OUTPATIENT)
Age: 32
End: 2022-05-17

## 2022-05-17 ENCOUNTER — TRANSCRIPTION ENCOUNTER (OUTPATIENT)
Age: 32
End: 2022-05-17

## 2022-06-09 ENCOUNTER — APPOINTMENT (OUTPATIENT)
Dept: GASTROENTEROLOGY | Facility: CLINIC | Age: 32
End: 2022-06-09
Payer: COMMERCIAL

## 2022-06-09 DIAGNOSIS — K22.4 DYSKINESIA OF ESOPHAGUS: ICD-10-CM

## 2022-06-09 DIAGNOSIS — K21.9 GASTRO-ESOPHAGEAL REFLUX DISEASE W/OUT ESOPHAGITIS: ICD-10-CM

## 2022-06-09 PROCEDURE — 99215 OFFICE O/P EST HI 40 MIN: CPT | Mod: 95

## 2022-06-10 ENCOUNTER — TRANSCRIPTION ENCOUNTER (OUTPATIENT)
Age: 32
End: 2022-06-10

## 2022-06-14 PROBLEM — K22.4 INEFFECTIVE ESOPHAGEAL MOTILITY: Status: ACTIVE | Noted: 2022-03-01

## 2022-06-14 PROBLEM — K21.9 GERD (GASTROESOPHAGEAL REFLUX DISEASE): Status: ACTIVE | Noted: 2019-05-28

## 2022-06-14 NOTE — ASSESSMENT
[FreeTextEntry1] : 32 yo F pmh BRCA with fam h/o ovarian, breast cancers s/p b/l mastectomy who presents for follow up for GERD despite therapy.  Still with IEM, Increased weak acid reflux events with excellent symptom correlation on BID PPI.  Labs do not suggest scleroderma, though there is a + CECY.  Ultimately while patient is doing reasonably well symptom wise at the current time, they may be a good candidate for barrier procedure.  Given IEM and no reported hernia (EGD last year, mano this year), may be a good candidate for TIFF consideration rather than fundoplication.  She is more interested in this as well.\par \par Impression:\par 1) GERD despite therapy -> persistent + weak acid reflux events with +SAP/SI\par 2) IEM with frequent aperistalsis (30% on recent study)\par 3) BRCA with fam h/o ovarian, breast cancers\par 4) s/p Mastectomy\par 5) + CECY\par \par Plan:\par 1) Referral for TIF consideration. Will also discuss at swallow conference\par 2) To follow with PCP for + CECY\par 3) C/w BID PPI at this time as doing well\par 4) Need to get GES records\par 5) Extensive review of recent tests with patient. Many questions answered. Plan agreed upon\par 6) RV pending above.

## 2022-06-14 NOTE — HISTORY OF PRESENT ILLNESS
[FreeTextEntry1] : Last visit: 3/2022\par Plan after last visit:\par Inlet patch of esophagus (750.4) (Q39.8)\par GERD (gastroesophageal reflux disease) (530.81) (K21.9)\par Ineffective esophageal motility (530.5) (K22.4)\par \par 32 yo F pmh BRCA with fam h/o ovarian, breast cancers s/p b/l mastectomy who presents for 2nd opinion re: GERD despite therapy. She has IEM with a positive pH study and excellent symptom correlation OFF therapy. However with persistent symptoms when on. Next step is repeat mano and pH testing ON therapy. Will r/o scleroderma via labs as well given frequent aperistalsis on study. Pending these testing - consideration for promotility agents and/or treatment of functional overlap can be considered. If persistent reflux - surgical options are likely to be of benefit.\par \par Impression:\par 1) GERD despite therapy\par 2) IEM with frequent aperistalsis\par 3) BRCA with fam h/o ovarian, breast cancers\par 4) s/p Mastectomy\par \par Plan:\par 1) Emano + 24 hour pH study ON THERAPY\par 2) Pending above to consider further treatments for IEM\par 3) Scleroderma labs\par 4) Repeat GES (she is already scheduled)\par 5) Extensive review of multiple old tests with patient. Many questions answered. Plan agreed upon\par 6) RV pending above. \par -------------------------------------------------------------------------------\par \par Since last visit:\par - Had mano, ph, and labs\par \par Currently:\par Still on Rabeprazole 20 mg BID\par Symptoms continue to be relatively well controlled on this regimen, but does feel breakthrough symptoms\par Belching, burning still presenting post prandially and noticeable\par About 2x/week may take a prn h2RA\par No true dysphagia, odynophagia, n/v\par Interested in considering barrier procedures\par \par -------------------------------------------------\par Prior Workup:\par \par Labs 3/2022\par + CECY\par Negative other scleroderma labs\par \par E-mano - Dena - 5/2022 - ON BID PPI\par +IEM\par 30% failed, 70% ineffective\par Limited peristaltic reserve\par \par pH Impedance ON BID THERAPY - 5/2022\par Negative AET\par Increased Weak reflux episdes\par + SAP, + SI to weak reflux\par \par \par E-mano 7/2021 - [facility] Robert\par Ineffective Esophageal motility - 60% failed, 40% weak\par IRP 5.9\par 100% incomplete bolus clearance\par Ineffective uprights\par Questionable adequate peristaltic reserve\par \par pH Impedance OFF therapy\par AET 12.6%\par 195 total reflux episodes\par + Heartburn SAP/SI correlation\par Positive for GERD\par \par EGD 12/2020 - Marcy Menjivar\par Normal Endoscopy other than some irritation\par \par GES - 2017 - Normal\par Repeat\par \par EGD 2017\par Grade A esophagitis\par \par Colon 2017:\par No polyps found\par \par VCE 2017:\par Normal\par \par \par CT Chest 2021:\par PROCEDURE:\par CT of the Chest was performed.\par Sagittal and coronal reformats were performed.\par \par FINDINGS:\par \par LUNGS AND AIRWAYS: Patent central airways. Lungs are clear.\par PLEURA: No pleural effusion.\par MEDIASTINUM AND JANA: No lymphadenopathy.\par VESSELS: Within normal limits.\par HEART: Heart size is normal. No pericardial effusion.\par CHEST WALL AND LOWER NECK: Bilateral retropectoral implants.\par VISUALIZED UPPER ABDOMEN: Subcentimeter hypoattenuating lesion in the dome of the liver, too small to characterize accurately.\par BONES: Within normal limits. No acute fractures\par \par IMPRESSION:\par No acute thoracic pathology.\par \par \par Pulm 2020\par Encounter for preventive health examination (V70.0) (Z00.00)\par BRCA1 gene mutation positive (V84.01) (Z15.01,Z15.09)\par History of bilateral mastoidectomy (V45.89) (Z90.89)\par Encounter for vaccination (V05.9) (Z23)\par Anemia (285.9) (D64.9)\par \par Labs drawn in office today\par GI evaluation.\par Recheck thyroid function. \par

## 2022-06-14 NOTE — PHYSICAL EXAM
[General Appearance - Alert] : alert [General Appearance - Well Nourished] : well nourished [Sclera] : the sclera and conjunctiva were normal [Extraocular Movements] : extraocular movements were intact [Outer Ear] : the ears and nose were normal in appearance [Hearing Threshold Finger Rub Not Aiken] : hearing was normal [No Focal Deficits] : no focal deficits [Impaired Insight] : insight and judgment were intact [Affect] : the affect was normal [FreeTextEntry1] : aox3

## 2022-06-22 ENCOUNTER — NON-APPOINTMENT (OUTPATIENT)
Age: 32
End: 2022-06-22

## 2022-06-28 ENCOUNTER — TRANSCRIPTION ENCOUNTER (OUTPATIENT)
Age: 32
End: 2022-06-28

## 2022-06-29 ENCOUNTER — TRANSCRIPTION ENCOUNTER (OUTPATIENT)
Age: 32
End: 2022-06-29

## 2022-07-01 ENCOUNTER — TRANSCRIPTION ENCOUNTER (OUTPATIENT)
Age: 32
End: 2022-07-01

## 2022-08-02 ENCOUNTER — NON-APPOINTMENT (OUTPATIENT)
Age: 32
End: 2022-08-02

## 2022-08-06 ENCOUNTER — NON-APPOINTMENT (OUTPATIENT)
Age: 32
End: 2022-08-06

## 2022-08-10 ENCOUNTER — APPOINTMENT (OUTPATIENT)
Dept: GASTROENTEROLOGY | Facility: HOSPITAL | Age: 32
End: 2022-08-10

## 2022-09-22 ENCOUNTER — APPOINTMENT (OUTPATIENT)
Dept: GASTROENTEROLOGY | Facility: CLINIC | Age: 32
End: 2022-09-22

## 2022-10-19 NOTE — ED ADULT TRIAGE NOTE - BRAND OF COVID-19 VACCINATION
Detail Level: Detailed Depth Of Biopsy: dermis Was A Bandage Applied: Yes Size Of Lesion In Cm: 0 Biopsy Type: H and E Biopsy Method: Dermablade Anesthesia Type: 1% lidocaine with epinephrine and a 1:10 solution of 8.4% sodium bicarbonate Anesthesia Volume In Cc: 0.5 Hemostasis: Drysol Wound Care: Vaseline Dressing: bandage Destruction After The Procedure: No Type Of Destruction Used: Curettage Curettage Text: The wound bed was treated with curettage after the biopsy was performed. Cryotherapy Text: The wound bed was treated with cryotherapy after the biopsy was performed. Electrodesiccation Text: The wound bed was treated with electrodesiccation after the biopsy was performed. Electrodesiccation And Curettage Text: The wound bed was treated with electrodesiccation and curettage after the biopsy was performed. Silver Nitrate Text: The wound bed was treated with silver nitrate after the biopsy was performed. Lab: [id number] Lab Facility:  Consent: Written consent was obtained and risks were reviewed including but not limited to scarring, infection, bleeding, scabbing, incomplete removal, nerve damage and allergy to anesthesia. Post-Care Instructions: I reviewed with the patient in detail post-care instructions. Patient is to keep the biopsy site dry overnight, and then apply bacitracin twice daily until healed. Patient may apply hydrogen peroxide soaks to remove any crusting. Notification Instructions: Patient will be notified of biopsy results. However, patient instructed to call the office if not contacted within 2 weeks. Billing Type: Third-Party Bill Information: Selecting Yes will display possible errors in your note based on the variables you have selected. This validation is only offered as a suggestion for you. PLEASE NOTE THAT THE VALIDATION TEXT WILL BE REMOVED WHEN YOU FINALIZE YOUR NOTE. IF YOU WANT TO FAX A PRELIMINARY NOTE YOU WILL NEED TO TOGGLE THIS TO 'NO' IF YOU DO NOT WANT IT IN YOUR FAXED NOTE. Moderna dose 1, 2, and 3

## 2022-10-22 ENCOUNTER — NON-APPOINTMENT (OUTPATIENT)
Age: 32
End: 2022-10-22

## 2022-12-11 ENCOUNTER — NON-APPOINTMENT (OUTPATIENT)
Age: 32
End: 2022-12-11

## 2023-08-23 NOTE — ED ADULT TRIAGE NOTE - PATIENT ON (OXYGEN DELIVERY METHOD)
room air Dapsone Pregnancy And Lactation Text: This medication is Pregnancy Category C and is not considered safe during pregnancy or breast feeding.

## 2023-09-09 ENCOUNTER — NON-APPOINTMENT (OUTPATIENT)
Age: 33
End: 2023-09-09

## 2023-09-17 ENCOUNTER — EMERGENCY (EMERGENCY)
Facility: HOSPITAL | Age: 33
LOS: 1 days | Discharge: HOME | End: 2023-09-17
Attending: EMERGENCY MEDICINE | Admitting: EMERGENCY MEDICINE
Payer: COMMERCIAL

## 2023-09-17 VITALS
OXYGEN SATURATION: 100 % | SYSTOLIC BLOOD PRESSURE: 127 MMHG | DIASTOLIC BLOOD PRESSURE: 68 MMHG | RESPIRATION RATE: 18 BRPM | TEMPERATURE: 98 F | HEART RATE: 68 BPM

## 2023-09-17 VITALS
RESPIRATION RATE: 18 BRPM | OXYGEN SATURATION: 100 % | TEMPERATURE: 98 F | SYSTOLIC BLOOD PRESSURE: 128 MMHG | DIASTOLIC BLOOD PRESSURE: 80 MMHG | HEART RATE: 70 BPM

## 2023-09-17 DIAGNOSIS — Z90.13 ACQUIRED ABSENCE OF BILATERAL BREASTS AND NIPPLES: Chronic | ICD-10-CM

## 2023-09-17 LAB
ALBUMIN SERPL ELPH-MCNC: 3.3 G/DL — SIGNIFICANT CHANGE UP (ref 3.3–5)
ALBUMIN SERPL ELPH-MCNC: 4 G/DL — SIGNIFICANT CHANGE UP (ref 3.3–5)
ALP SERPL-CCNC: 86 U/L — SIGNIFICANT CHANGE UP (ref 40–120)
ALP SERPL-CCNC: 98 U/L — SIGNIFICANT CHANGE UP (ref 40–120)
ALT FLD-CCNC: 23 U/L — SIGNIFICANT CHANGE UP (ref 4–33)
ANION GAP SERPL CALC-SCNC: 8 MMOL/L — SIGNIFICANT CHANGE UP (ref 7–14)
ANION GAP SERPL CALC-SCNC: 9 MMOL/L — SIGNIFICANT CHANGE UP (ref 7–14)
APAP SERPL-MCNC: <10 UG/ML — LOW (ref 15–25)
AST SERPL-CCNC: 18 U/L — SIGNIFICANT CHANGE UP (ref 4–32)
AST SERPL-CCNC: 23 U/L — SIGNIFICANT CHANGE UP (ref 4–32)
BASOPHILS # BLD AUTO: 0.03 K/UL — SIGNIFICANT CHANGE UP (ref 0–0.2)
BASOPHILS NFR BLD AUTO: 0.2 % — SIGNIFICANT CHANGE UP (ref 0–2)
BILIRUB SERPL-MCNC: 0.3 MG/DL — SIGNIFICANT CHANGE UP (ref 0.2–1.2)
BUN SERPL-MCNC: 8 MG/DL — SIGNIFICANT CHANGE UP (ref 7–23)
BUN SERPL-MCNC: 9 MG/DL — SIGNIFICANT CHANGE UP (ref 7–23)
CALCIUM SERPL-MCNC: 8.8 MG/DL — SIGNIFICANT CHANGE UP (ref 8.4–10.5)
CALCIUM SERPL-MCNC: 9.5 MG/DL — SIGNIFICANT CHANGE UP (ref 8.4–10.5)
CHLORIDE SERPL-SCNC: 101 MMOL/L — SIGNIFICANT CHANGE UP (ref 98–107)
CHLORIDE SERPL-SCNC: 99 MMOL/L — SIGNIFICANT CHANGE UP (ref 98–107)
CO2 SERPL-SCNC: 23 MMOL/L — SIGNIFICANT CHANGE UP (ref 22–31)
CO2 SERPL-SCNC: 26 MMOL/L — SIGNIFICANT CHANGE UP (ref 22–31)
CREAT SERPL-MCNC: 0.65 MG/DL — SIGNIFICANT CHANGE UP (ref 0.5–1.3)
CREAT SERPL-MCNC: 0.68 MG/DL — SIGNIFICANT CHANGE UP (ref 0.5–1.3)
EGFR: 120 ML/MIN/1.73M2 — SIGNIFICANT CHANGE UP
EGFR: 121 ML/MIN/1.73M2 — SIGNIFICANT CHANGE UP
EOSINOPHIL # BLD AUTO: 0.06 K/UL — SIGNIFICANT CHANGE UP (ref 0–0.5)
EOSINOPHIL NFR BLD AUTO: 0.5 % — SIGNIFICANT CHANGE UP (ref 0–6)
ETHANOL SERPL-MCNC: <10 MG/DL — SIGNIFICANT CHANGE UP
GLUCOSE SERPL-MCNC: 82 MG/DL — SIGNIFICANT CHANGE UP (ref 70–99)
GLUCOSE SERPL-MCNC: 97 MG/DL — SIGNIFICANT CHANGE UP (ref 70–99)
HCG SERPL-ACNC: 27.6 MIU/ML — SIGNIFICANT CHANGE UP
HCT VFR BLD CALC: 34.4 % — LOW (ref 34.5–45)
HGB BLD-MCNC: 11.3 G/DL — LOW (ref 11.5–15.5)
IANC: 9.77 K/UL — HIGH (ref 1.8–7.4)
IMM GRANULOCYTES NFR BLD AUTO: 0.5 % — SIGNIFICANT CHANGE UP (ref 0–0.9)
LIDOCAIN IGE QN: 14 U/L — SIGNIFICANT CHANGE UP (ref 7–60)
LYMPHOCYTES # BLD AUTO: 1.82 K/UL — SIGNIFICANT CHANGE UP (ref 1–3.3)
LYMPHOCYTES # BLD AUTO: 14.8 % — SIGNIFICANT CHANGE UP (ref 13–44)
MCHC RBC-ENTMCNC: 27.8 PG — SIGNIFICANT CHANGE UP (ref 27–34)
MCHC RBC-ENTMCNC: 32.8 GM/DL — SIGNIFICANT CHANGE UP (ref 32–36)
MCV RBC AUTO: 84.7 FL — SIGNIFICANT CHANGE UP (ref 80–100)
MONOCYTES # BLD AUTO: 0.53 K/UL — SIGNIFICANT CHANGE UP (ref 0–0.9)
MONOCYTES NFR BLD AUTO: 4.3 % — SIGNIFICANT CHANGE UP (ref 2–14)
NEUTROPHILS # BLD AUTO: 9.77 K/UL — HIGH (ref 1.8–7.4)
NEUTROPHILS NFR BLD AUTO: 79.7 % — HIGH (ref 43–77)
NRBC # BLD: 0 /100 WBCS — SIGNIFICANT CHANGE UP (ref 0–0)
NRBC # FLD: 0 K/UL — SIGNIFICANT CHANGE UP (ref 0–0)
PLATELET # BLD AUTO: 196 K/UL — SIGNIFICANT CHANGE UP (ref 150–400)
POTASSIUM SERPL-MCNC: 3.9 MMOL/L — SIGNIFICANT CHANGE UP (ref 3.5–5.3)
POTASSIUM SERPL-MCNC: 4.1 MMOL/L — SIGNIFICANT CHANGE UP (ref 3.5–5.3)
POTASSIUM SERPL-SCNC: 3.9 MMOL/L — SIGNIFICANT CHANGE UP (ref 3.5–5.3)
POTASSIUM SERPL-SCNC: 4.1 MMOL/L — SIGNIFICANT CHANGE UP (ref 3.5–5.3)
PROT SERPL-MCNC: 6 G/DL — SIGNIFICANT CHANGE UP (ref 6–8.3)
PROT SERPL-MCNC: 7.4 G/DL — SIGNIFICANT CHANGE UP (ref 6–8.3)
RBC # BLD: 4.06 M/UL — SIGNIFICANT CHANGE UP (ref 3.8–5.2)
RBC # FLD: 12.2 % — SIGNIFICANT CHANGE UP (ref 10.3–14.5)
SALICYLATES SERPL-MCNC: <0.3 MG/DL — LOW (ref 15–30)
SODIUM SERPL-SCNC: 133 MMOL/L — LOW (ref 135–145)
WBC # BLD: 12.27 K/UL — HIGH (ref 3.8–10.5)
WBC # FLD AUTO: 12.27 K/UL — HIGH (ref 3.8–10.5)

## 2023-09-17 PROCEDURE — 93010 ELECTROCARDIOGRAM REPORT: CPT

## 2023-09-17 PROCEDURE — 99285 EMERGENCY DEPT VISIT HI MDM: CPT

## 2023-09-17 PROCEDURE — 74177 CT ABD & PELVIS W/CONTRAST: CPT | Mod: 26,MA

## 2023-09-17 PROCEDURE — 76705 ECHO EXAM OF ABDOMEN: CPT | Mod: 26

## 2023-09-17 RX ORDER — ACETYLCYSTEINE 200 MG/ML
11 VIAL (ML) MISCELLANEOUS ONCE
Refills: 0 | Status: DISCONTINUED | OUTPATIENT
Start: 2023-09-17 | End: 2023-09-17

## 2023-09-17 RX ORDER — ONDANSETRON 8 MG/1
4 TABLET, FILM COATED ORAL ONCE
Refills: 0 | Status: COMPLETED | OUTPATIENT
Start: 2023-09-17 | End: 2023-09-17

## 2023-09-17 RX ORDER — ONDANSETRON 8 MG/1
1 TABLET, FILM COATED ORAL
Qty: 9 | Refills: 0
Start: 2023-09-17 | End: 2023-09-19

## 2023-09-17 RX ORDER — SODIUM CHLORIDE 9 MG/ML
1000 INJECTION INTRAMUSCULAR; INTRAVENOUS; SUBCUTANEOUS ONCE
Refills: 0 | Status: COMPLETED | OUTPATIENT
Start: 2023-09-17 | End: 2023-09-17

## 2023-09-17 RX ADMIN — ONDANSETRON 4 MILLIGRAM(S): 8 TABLET, FILM COATED ORAL at 08:20

## 2023-09-17 RX ADMIN — SODIUM CHLORIDE 1000 MILLILITER(S): 9 INJECTION INTRAMUSCULAR; INTRAVENOUS; SUBCUTANEOUS at 08:20

## 2023-09-17 NOTE — ED PROVIDER NOTE - CLINICAL SUMMARY MEDICAL DECISION MAKING FREE TEXT BOX
Marbella [facility] PGY-3: 31 yo F hx GERD, s/p preventative b/l mastectomy, presenting s/p accidental tylenol overdose. Pt states she had an egg retrieval procedure on Friday 9/16 10 AM, then started to take tylenol 2 pills extra strength (500 mg) every 4 hrs. Misread the time and took more pills than she meant to, took 30 pills of 500 mg tyelnol between 11 AM on Friday (9/16) and 2:45 this AM (9/17), for total 30x500 = 15 g tylenol. +upper abd ttp. Pt well appearing. concern for tyelnol overdose. Labs, ekg, tox      EKG HR 63, NSR, , QRS 88, QTc 413

## 2023-09-17 NOTE — CONSULT NOTE ADULT - ASSESSMENT
31 yo F hx, gerd and mastectomy, who presents with inadvertent chronic acetaminophen overdose, around 15 grams from Friday until this morning. Her most recent dose 2:45 am today. She has baseline abdominal pain from procedure. She has normal vitals, and on exam per ED team had epigastric tenderness. EKG unremarkable. Labs show apap < 10, alt 23, ast 23.    Given patient has an undetectable acetaminphen level with normal LFTs, she is does not have and is not expected to develop hepatotoxicity.     Acetaminophen can cause hepatotoxicity via metabolism by CPY2E1 enzyme into NAPQI, which depletes the liver's glutathione capacity leading to oxidative injury. In massive overdose, nephrotoxicity, metabolic acidosis with lactatemia, and altered mentation could occur. The Andrade normogram allows us to estimate the likelihood of hepatotoxicity in an acute ingestion within the first 24 hours, and guide whether or not to initiate n-acetylcysteine (NAC) therapy. NAC is an antioxidant and detoxified NAQPI.    #Recommendations  - Cleared from acute toxicological standpoint  - Please instruct patient on correct apap dosage  - Further medical and/or psychiatric care per primary team    Thank you for involving us in the care of this patient. Assessment and plan discussed with toxicology attending Dr. Jayden Orta. Please do not hesitate to reach out to the toxicology team for any further questions or concerns.    The On-Call Toxicology Fellow can be reached 24/7 via Pager #[phone]  Please send a 10 digit call back # as [place] cover multiple hospitals    John Oropeza MD  Toxicology Fellow  PGY-5       33 yo F hx, gerd and mastectomy, who presents with inadvertent chronic acetaminophen overdose, around 15 grams from Friday until this morning. Her most recent dose 2:45 am today. She has baseline abdominal pain from procedure. She has normal vitals, and on exam per ED team had epigastric tenderness. EKG unremarkable. Labs show apap < 10, alt 23, ast 23.    Given patient has an undetectable acetaminphen level with normal LFTs, she is does not have and is not expected to develop hepatotoxicity.     Acetaminophen can cause hepatotoxicity via metabolism by CPY2E1 enzyme into NAPQI, which depletes the liver's glutathione capacity leading to oxidative injury. In massive overdose, nephrotoxicity, metabolic acidosis with lactatemia, and altered mentation could occur. The Andrade normogram allows us to estimate the likelihood of hepatotoxicity in an acute ingestion within the first 24 hours, and guide whether or not to initiate n-acetylcysteine (NAC) therapy. NAC is an antioxidant and detoxified NAQPI.    #Recommendations  - Cleared from acute toxicological standpoint  - Please instruct patient on correct apap dosage  - Further medical and/or psychiatric care per primary team    Thank you for involving us in the care of this patient. Assessment and plan discussed with toxicology attending Dr. Jayden Orta. Please do not hesitate to reach out to the toxicology team for any further questions or concerns.    The On-Call Toxicology Fellow can be reached 24/7 via Pager #[phone]  Please send a 10 digit call back # as [place] cover multiple hospitals    John Oropeza MD  Toxicology Fellow  PGY-5       31 yo F hx, gerd and mastectomy, who presents with inadvertent chronic acetaminophen overdose, around 15 grams from Friday until this morning. Her most recent dose 2:45 am today. She has baseline abdominal pain from procedure. She has normal vitals, and on exam per ED team had epigastric tenderness. EKG unremarkable. Labs show apap < 10, alt 23, ast 23.    Given patient has an undetectable acetaminphen level with normal LFTs, she is does not have and is not expected to develop hepatotoxicity.     Acetaminophen can cause hepatotoxicity via metabolism by CPY2E1 enzyme into NAPQI, which depletes the liver's glutathione capacity leading to oxidative injury. In massive overdose, nephrotoxicity, metabolic acidosis with lactatemia, and altered mentation could occur. The Andrade normogram allows us to estimate the likelihood of hepatotoxicity in an acute ingestion within the first 24 hours, and guide whether or not to initiate n-acetylcysteine (NAC) therapy. NAC is an antioxidant and detoxified NAQPI.    #Recommendations  - Cleared from acute toxicological standpoint  - Please instruct patient on correct apap dosage  - Further medical and/or psychiatric care per primary team    Thank you for involving us in the care of this patient. Assessment and plan discussed with toxicology attending Dr. Jayden Orta. Please do not hesitate to reach out to the toxicology team for any further questions or concerns.    The On-Call Toxicology Fellow can be reached 24/7 via Pager #[phone]  Please send a 10 digit call back # as [place] cover multiple hospitals    John Oropeza MD  Toxicology Fellow  PGY-5

## 2023-09-17 NOTE — ED PROVIDER NOTE - NSFOLLOWUPINSTRUCTIONS_ED_ALL_ED_FT
It was a pleasure caring for you today.  Please make sure to stay hydrated and get plenty of rest.  Please make sure to follow-up with your primary care doctor in the next 2 to 3 days for follow-up care.  Please return to the hospital if you have any new or worsening symptoms including   Worsening abdominal pain, fevers or chills, inability to eat or drink.    Please follow-up with your OB/GYN doctors for the next few days.     please do not take more than 4 g or 4000 mg of Tylenol a day.  You can safely take 1 g or 1000 mg of Tylenol every 6 hours in a 24-hour period.      As discussed earlier, please follow-up with your primary care doctor to get further imaging for the incidental finding found on your ultrasound today of  "Probable hepatic hemangiomas. Contrast enhanced MR may be obtained for   confirmation."

## 2023-09-17 NOTE — ED PROVIDER NOTE - PROGRESS NOTE DETAILS
Marbella [facility] PGY-3: tox paged awaiting call back. Marbella [facility] PGY-3: spoke with tox, awaiting transaminitis. Thanh Garcia, PGY3 This patient was signed out to me.  At signout patient's labs were being drawn.  I called tox to close the loop in communication.  concern for acute ingestion was low as patient was taking  Tylenol and intervals.  We will be awaiting labs for transaminitis to start NAC if necessary.  Patient is well-appearing at bedside. Thanh Garcia, PGY3 Patient's pain improved from earlier.  Repeat Tylenol and LFTs are unchanged.  CTs show no acute findings requiring further work-up other than post egg retrieval changes.  Discussed results with patient and need to follow-up with her OB/GYN.  Will DC with return precautions.  Discussed incidental findings on imaging with patient and need for follow-up. Thanh Garcia, PGY3 Patient's pain improved from earlier.  Repeat Tylenol and LFTs are unchanged.  CTs show no acute findings requiring further work-up other than post egg retrieval changes.  Discussed results with patient and need to follow-up with her OB/GYN.  Will DC with return precautions.  Discussed incidental findings on imaging with patient and need for follow-up. pt taking PO at bedside.

## 2023-09-17 NOTE — ED ADULT NURSE NOTE - NSFALLUNIVINTERV_ED_ALL_ED
Bed/Stretcher in lowest position, wheels locked, appropriate side rails in place/Call bell, personal items and telephone in reach/Instruct patient to call for assistance before getting out of bed/chair/stretcher/Non-slip footwear applied when patient is off stretcher/[place] to call system/Physically safe environment - no spills, clutter or unnecessary equipment/Purposeful proactive rounding/Room/bathroom lighting operational, light cord in reach Bed/Stretcher in lowest position, wheels locked, appropriate side rails in place/Call bell, personal items and telephone in reach/Instruct patient to call for assistance before getting out of bed/chair/stretcher/Non-slip footwear applied when patient is off stretcher/[place] to call system/Physically safe environment - no spills, clutter or unnecessary equipment/Purposeful proactive rounding/Room/bathroom lighting operational, light cord in reach Bed/Stretcher in lowest position, wheels locked, appropriate side rails in place/Call bell, personal items and telephone in reach/Instruct patient to call for assistance before getting out of bed/chair/stretcher/Non-slip footwear applied when patient is off stretcher/[place] to call system/Physically safe environment - no spills, clutter or unnecessary equipment/Purposeful proactive rounding/Room/bathroom lighting operational, light cord in reach

## 2023-09-17 NOTE — CONSULT NOTE ADULT - SUBJECTIVE AND OBJECTIVE BOX
MEDICAL TOXICOLOGY CONSULT    HPI: 31 yo F hx, gerd and mastectomy, who presents with inadvertent acetaminophen overdose. She had an egg retrieval procedure on Friday procedure, and since Friday morning has been taking Apap 2x 500 mg every few hours until most recent dose 2:45 am today. She estimates a total of 15 grams ingested in that time period. She has baseline abdominal pain from procedure.    ONSET / TIME of exposure(s): chronic     QUANTITY of exposure(s): 15 g total    ROUTE of exposure:  INGESTION     CONTEXT of exposure: at home    ASSOCIATED symptoms: abd pain    PAST MEDICAL & SURGICAL HISTORY:  GERD (gastroesophageal reflux disease)      H/O bilateral mastectomy          MEDICATION HISTORY:      FAMILY HISTORY:      REVIEW OF SYSTEMS:   _____unable to perform due to intoxication, dementia, or illness      Vital Signs Last 24 Hrs  T(C): 36.8 (17 Sep 2023 05:03), Max: 36.8 (17 Sep 2023 05:03)  T(F): 98.3 (17 Sep 2023 05:03), Max: 98.3 (17 Sep 2023 05:03)  HR: 70 (17 Sep 2023 05:03) (70 - 70)  BP: 128/80 (17 Sep 2023 05:03) (128/80 - 128/80)  BP(mean): --  RR: 18 (17 Sep 2023 05:03) (18 - 18)  SpO2: 100% (17 Sep 2023 05:03) (100% - 100%)    Parameters below as of 17 Sep 2023 05:03  Patient On (Oxygen Delivery Method): room air        SIGNIFICANT LABORATORY STUDIES:                        11.3   12.27 )-----------( 196      ( 17 Sep 2023 07:17 )             34.4       09-17    133<L>  |  101  |  8   ----------------------------<  82  3.9   |  23  |  0.65    Ca    8.8      17 Sep 2023 10:55    TPro  6.0  /  Alb  3.3  /  TBili  0.3  /  DBili  x   /  AST  18  /  ALT  23  /  AlkPhos  86  09-17          Urinalysis Basic - ( 17 Sep 2023 10:55 )    Color: x / Appearance: x / SG: x / pH: x  Gluc: 82 mg/dL / Ketone: x  / Bili: x / Urobili: x   Blood: x / Protein: x / Nitrite: x   Leuk Esterase: x / RBC: x / WBC x   Sq Epi: x / Non Sq Epi: x / Bacteria: x        Anion Gap: 9 09-17 @ 10:55  CK: -- 09-17 @ 10:55  Troponin:  --  09-17 @ 10:55  Pro-BNP:  --  09-17 @ 10:55  VBG:  --  09-17 @ 10:55  Carboxyhemoglobin %:  --  09-17 @ 10:55  Methemoglobin %:  --  09-17 @ 10:55  Osmolality Serum:  --  09-17 @ 10:55  Aspirin Level: --  09-17 @ 10:55  Acetaminophen Level:  <10<L>  09-17 @ 10:55  Ethanol Level:  --  09-17 @ 10:55  Digoxin Level:  --  09-17 @ 10:55  Phenytoin Level:  --  09-17 @ 10:55  Carbamazepine level:  --  09-17 @ 10:55  Lamotrigine level:  --  09-17 @ 10:55  Anion Gap: 8 09-17 @ 07:17  CK: -- 09-17 @ 07:17  Troponin:  --  09-17 @ 07:17  Pro-BNP:  --  09-17 @ 07:17  VBG:  --  09-17 @ 07:17  Carboxyhemoglobin %:  --  09-17 @ 07:17  Methemoglobin %:  --  09-17 @ 07:17  Osmolality Serum:  --  09-17 @ 07:17  Aspirin Level: <0.3<L>  09-17 @ 07:17  Acetaminophen Level:  <10<L>  09-17 @ 07:17  Ethanol Level:  --  09-17 @ 07:17  Digoxin Level:  --  09-17 @ 07:17  Phenytoin Level:  --  09-17 @ 07:17  Carbamazepine level:  --  09-17 @ 07:17  Lamotrigine level:  --  09-17 @ 07:17     MEDICAL TOXICOLOGY CONSULT    HPI: 33 yo F hx, gerd and mastectomy, who presents with inadvertent acetaminophen overdose. She had an egg retrieval procedure on Friday procedure, and since Friday morning has been taking Apap 2x 500 mg every few hours until most recent dose 2:45 am today. She estimates a total of 15 grams ingested in that time period. She has baseline abdominal pain from procedure.    ONSET / TIME of exposure(s): chronic     QUANTITY of exposure(s): 15 g total    ROUTE of exposure:  INGESTION     CONTEXT of exposure: at home    ASSOCIATED symptoms: abd pain    PAST MEDICAL & SURGICAL HISTORY:  GERD (gastroesophageal reflux disease)      H/O bilateral mastectomy          MEDICATION HISTORY:      FAMILY HISTORY:      REVIEW OF SYSTEMS:   _____unable to perform due to intoxication, dementia, or illness      Vital Signs Last 24 Hrs  T(C): 36.8 (17 Sep 2023 05:03), Max: 36.8 (17 Sep 2023 05:03)  T(F): 98.3 (17 Sep 2023 05:03), Max: 98.3 (17 Sep 2023 05:03)  HR: 70 (17 Sep 2023 05:03) (70 - 70)  BP: 128/80 (17 Sep 2023 05:03) (128/80 - 128/80)  BP(mean): --  RR: 18 (17 Sep 2023 05:03) (18 - 18)  SpO2: 100% (17 Sep 2023 05:03) (100% - 100%)    Parameters below as of 17 Sep 2023 05:03  Patient On (Oxygen Delivery Method): room air        SIGNIFICANT LABORATORY STUDIES:                        11.3   12.27 )-----------( 196      ( 17 Sep 2023 07:17 )             34.4       09-17    133<L>  |  101  |  8   ----------------------------<  82  3.9   |  23  |  0.65    Ca    8.8      17 Sep 2023 10:55    TPro  6.0  /  Alb  3.3  /  TBili  0.3  /  DBili  x   /  AST  18  /  ALT  23  /  AlkPhos  86  09-17          Urinalysis Basic - ( 17 Sep 2023 10:55 )    Color: x / Appearance: x / SG: x / pH: x  Gluc: 82 mg/dL / Ketone: x  / Bili: x / Urobili: x   Blood: x / Protein: x / Nitrite: x   Leuk Esterase: x / RBC: x / WBC x   Sq Epi: x / Non Sq Epi: x / Bacteria: x        Anion Gap: 9 09-17 @ 10:55  CK: -- 09-17 @ 10:55  Troponin:  --  09-17 @ 10:55  Pro-BNP:  --  09-17 @ 10:55  VBG:  --  09-17 @ 10:55  Carboxyhemoglobin %:  --  09-17 @ 10:55  Methemoglobin %:  --  09-17 @ 10:55  Osmolality Serum:  --  09-17 @ 10:55  Aspirin Level: --  09-17 @ 10:55  Acetaminophen Level:  <10<L>  09-17 @ 10:55  Ethanol Level:  --  09-17 @ 10:55  Digoxin Level:  --  09-17 @ 10:55  Phenytoin Level:  --  09-17 @ 10:55  Carbamazepine level:  --  09-17 @ 10:55  Lamotrigine level:  --  09-17 @ 10:55  Anion Gap: 8 09-17 @ 07:17  CK: -- 09-17 @ 07:17  Troponin:  --  09-17 @ 07:17  Pro-BNP:  --  09-17 @ 07:17  VBG:  --  09-17 @ 07:17  Carboxyhemoglobin %:  --  09-17 @ 07:17  Methemoglobin %:  --  09-17 @ 07:17  Osmolality Serum:  --  09-17 @ 07:17  Aspirin Level: <0.3<L>  09-17 @ 07:17  Acetaminophen Level:  <10<L>  09-17 @ 07:17  Ethanol Level:  --  09-17 @ 07:17  Digoxin Level:  --  09-17 @ 07:17  Phenytoin Level:  --  09-17 @ 07:17  Carbamazepine level:  --  09-17 @ 07:17  Lamotrigine level:  --  09-17 @ 07:17

## 2023-09-17 NOTE — ED ADULT NURSE NOTE - TEMPLATE
Action 5/31/23   Fax #[phone]   Action Taken Requested holter 1-18-23. EKGs already in [facility]    Sent holter to Bharti's email 6/1     Action 5/31/23   Fax #[phone]   Action Taken Requesed echo 2-6-23    Resolved in PACS 6/1     
not applicable
General

## 2023-09-17 NOTE — ED ADULT NURSE NOTE - OBJECTIVE STATEMENT
A&Ox4. Past medical history of GERD, s/p preventative b/l mastectomy. Presenting s/p accidental tylenol overdose. Pt states she had an egg retrieval procedure on Friday 9/16 10 AM, then started to take tylenol 2 pills extra strength (500 mg) every 4 hrs. Misread the time and took more pills than she meant to, took 30 pills of 500 mg tyelnol between 11 AM on Friday (9/16) and 2:45 this AM (9/17), for total 30x500 = 15 g tylenol. Pt endorsing abdominal pain s/p egg retrieval procedure and new-onset upper abd pain x hrs. Does endorse nausea. Pt discharged on doxycyline and cabergoline s/p egg retrieval procedure. Tox consult.   Denies any SI. No chest pain, sob, fever/chills. Respiratione even and unlabored. 20 gauge Placed in right AC.

## 2023-09-17 NOTE — ED PROVIDER NOTE - PATIENT PORTAL LINK FT
You can access the FollowMyHealth Patient Portal offered by [facility] by registering at the following website: [email or web address]. By joining [email or web address]’s FollowMyHealth portal, you will also be able to view your health information using other applications (apps) compatible with our system. You can access the FollowMyHealth Patient Portal offered by [facility] by registering at the following website: [email or web address]. By joining [employer]’s FollowMyHealth portal, you will also be able to view your health information using other applications (apps) compatible with our system. You can access the FollowMyHealth Patient Portal offered by [facility] by registering at the following website: [email or web address]. By joining [employer]’s FollowMyHealth portal, you will also be able to view your health information using other applications (apps) compatible with our system. You can access the FollowMyHealth Patient Portal offered by [facility] by registering at the following website: [email or web address]. By joining [email or web address]’s FollowMyHealth portal, you will also be able to view your health information using other applications (apps) compatible with our system. You can access the FollowMyHealth Patient Portal offered by [facility] by registering at the following website: [email or web address]. By joining [employer]’s FollowMyHealth portal, you will also be able to view your health information using other applications (apps) compatible with our system. You can access the FollowMyHealth Patient Portal offered by [facility] by registering at the following website: [email or web address]. By joining [email or web address]’s FollowMyHealth portal, you will also be able to view your health information using other applications (apps) compatible with our system.

## 2023-09-17 NOTE — ED PROVIDER NOTE - PHYSICAL EXAMINATION
Gen: NAD; well appearing  Head: NCAT  Eyes: EOMI, PERRLA, no conjunctival pallor, no scleral icterus  ENT: mucous membranes moist, no discharge  Neck: neck supple  Resp: CTAB, no W/R/R  CV: RRR, +S1/S2, no M/R/G  GI: Abdomen soft non-distended, +upper abd ttp  MSK: No open wounds, no bruising, no lower extremity edema  Neuro: A&Ox4, sensation nl, motor 5/5 RUE/LUE/RLE/LLE, follows commands  Ext: no edema, no deformity, warm and well-perfused  Skin: no rash or bruising

## 2023-09-17 NOTE — ED PROVIDER NOTE - OBJECTIVE STATEMENT
31 yo F hx GERD, s/p preventative b/l mastectomy, presenting s/p accidental tylenol overdose. Pt states she had an egg retrieval procedure on Friday 9/16 10 AM, then started to take tylenol 2 pills extra strength (500 mg) every 4 hrs. Misread the time and took more pills than she meant to, took 30 pills of 500 mg tyelnol between 11 AM on Friday (9/16) and 2:45 this AM (9/17), for total 30x500 = 15 g tylenol. Pt endorsing abdominal pain s/p egg retrieval procedure and new-onset upper abd pain x hrs. Denies any SI. No chest pain, sob, fever/chills. Does endorse nausea. Pt discharged on doxycyline and cabergoline s/p egg retrieval procedure. Tox consult. 33 yo F hx GERD, s/p preventative b/l mastectomy, presenting s/p accidental tylenol overdose. Pt states she had an egg retrieval procedure on Friday 9/16 10 AM, then started to take tylenol 2 pills extra strength (500 mg) every 4 hrs. Misread the time and took more pills than she meant to, took 30 pills of 500 mg tyelnol between 11 AM on Friday (9/16) and 2:45 this AM (9/17), for total 30x500 = 15 g tylenol. Pt endorsing abdominal pain s/p egg retrieval procedure and new-onset upper abd pain x hrs. Denies any SI. No chest pain, sob, fever/chills. Does endorse nausea. Pt discharged on doxycyline and cabergoline s/p egg retrieval procedure. Tox consult.

## 2023-09-17 NOTE — ED PROVIDER NOTE - ATTENDING CONTRIBUTION TO CARE
DR. MUSA, ATTENDING MD-  I performed a face to face bedside interview with the patient regarding history of present illness, review of symptoms and past medical history. I completed an independent physical exam.  I have discussed the patient's plan of care with the resident.   Documentation as above in the note.    31 y/o female with recent egg retrieval procedure, accidental overdose with tylenol d/t post-procedure pain.  Approx 15g over two days.  Obtain ekg ucg cbc cmp tylenol level consult tox.

## 2023-09-17 NOTE — ED ADULT TRIAGE NOTE - CHIEF COMPLAINT QUOTE
Patient c/o possible accidental tylenol overdose. Took about 7g on Friday (9/15) and about 8g Saturday (9/16). Had egg retrieval procedure on 9/15. Endorsing N/V and abdominal pain. Denies SI, chest pain, SOB, dizziness and blurry vision. Hx. preventative double mastectomy and GERD. Patient well appearing.

## 2023-09-17 NOTE — ED ADULT NURSE REASSESSMENT NOTE - NS ED NURSE REASSESS COMMENT FT1
Pt awake and alert x 4 no nausea or vom ting noted. Pt denies  sob or chest pain awaiting abdominal ct.

## 2024-02-23 ENCOUNTER — NON-APPOINTMENT (OUTPATIENT)
Age: 34
End: 2024-02-23

## 2024-04-15 PROBLEM — K21.9 GASTRO-ESOPHAGEAL REFLUX DISEASE WITHOUT ESOPHAGITIS: Chronic | Status: ACTIVE | Noted: 2023-09-17

## 2024-04-22 ENCOUNTER — APPOINTMENT (OUTPATIENT)
Age: 34
End: 2024-04-22
Payer: COMMERCIAL

## 2024-04-22 VITALS
WEIGHT: 135 LBS | OXYGEN SATURATION: 99 % | BODY MASS INDEX: 25.49 KG/M2 | DIASTOLIC BLOOD PRESSURE: 70 MMHG | SYSTOLIC BLOOD PRESSURE: 110 MMHG | HEIGHT: 61 IN | HEART RATE: 63 BPM

## 2024-04-22 DIAGNOSIS — M22.2X2 PATELLOFEMORAL DISORDERS, RIGHT KNEE: ICD-10-CM

## 2024-04-22 DIAGNOSIS — M22.2X1 PATELLOFEMORAL DISORDERS, RIGHT KNEE: ICD-10-CM

## 2024-04-22 PROCEDURE — 99203 OFFICE O/P NEW LOW 30 MIN: CPT

## 2024-04-22 NOTE — HISTORY OF PRESENT ILLNESS
[de-identified] : CELESTINA ANDERSON is a 33 year old female presenting with 1 year history of anterior bilateral knee pain.  She states she wanted to get into better shape and started working with a  1 year ago and gradually started to develop bilateral anterior knee pain mostly with exercise and activity.  She states 1 month ago this started to flareup again and she wanted to see someone about it to get more definitive treatment.  She continued with her  and they have tried scaling back a bit.  Pain is worse in the anterior part of the knees and going up and down stairs.  Denies any buckling catching locking of the knee.  Here today for further evaluation.

## 2024-04-22 NOTE — PHYSICAL EXAM
[de-identified] : Knee (bilateral)   Inspection  Skin: normal  Effusion: normal  Bursa swelling: none   Palpation  Tenderness: Mild Location: Medial and lateral patellar facets proximally, proximal quadriceps tendon  Crepitus: none.  Defect: none.  Popliteal fullness: negative.   Flexion  Active ROM: normal  Passive ROM: normal    Extension  Normal     Straight Leg Raise- can perform  Motor strength  Flexion: 5/5  Extension: 5/5   Sensory index  Normal.   ACL/PCL tests  Lachman test: stable  Anterior drawer: stable  Posterior drawer: stable   MCL/LCL tests  MCL laxity: stable  LCL laxity: stable   Patellofemoral tests  Patellar grind test: negative  Patellar apprehension: negative   Meniscal tests  Gabino's test: normal  Thessalys: Normal   Single-leg squat: Painful bilaterally mildly Hip abduction: Left side 4/5, right side 4+/5

## 2024-04-22 NOTE — DISCUSSION/SUMMARY
[de-identified] : CELESTINA ANDERSON is a 33 year old female presenting with 1 year history of bilateral anterior knee pain consistent with patellofemoral pain syndrome.  Overall recommend the followin.  Referral given to physical therapy and formal rehab exercise handouts given in office today 2.  Patient will follow-up in 2 to 3 months as needed can consider x-rays at that time

## 2024-09-26 ENCOUNTER — APPOINTMENT (OUTPATIENT)
Age: 34
End: 2024-09-26
Payer: COMMERCIAL

## 2024-09-26 VITALS
HEART RATE: 58 BPM | DIASTOLIC BLOOD PRESSURE: 64 MMHG | WEIGHT: 122 LBS | SYSTOLIC BLOOD PRESSURE: 116 MMHG | BODY MASS INDEX: 23.03 KG/M2 | OXYGEN SATURATION: 97 % | HEIGHT: 61 IN

## 2024-09-26 DIAGNOSIS — M22.2X1 PATELLOFEMORAL DISORDERS, RIGHT KNEE: ICD-10-CM

## 2024-09-26 DIAGNOSIS — S16.1XXA STRAIN OF MUSCLE, FASCIA AND TENDON AT NECK LEVEL, INITIAL ENCOUNTER: ICD-10-CM

## 2024-09-26 DIAGNOSIS — M22.2X2 PATELLOFEMORAL DISORDERS, RIGHT KNEE: ICD-10-CM

## 2024-09-26 PROCEDURE — 99213 OFFICE O/P EST LOW 20 MIN: CPT

## 2024-09-26 NOTE — PHYSICAL EXAM
[de-identified] : Knee (bilateral)   Inspection  Skin: normal  Effusion: normal  Bursa swelling: none   Palpation  Tenderness: Mild Location: Medial and lateral patellar facets proximally, proximal quadriceps tendon  Crepitus: none.  Defect: none.  Popliteal fullness: negative.   Flexion  Active ROM: normal  Passive ROM: normal    Extension  Normal     Straight Leg Raise- can perform  Motor strength  Flexion: 5/5  Extension: 5/5   Sensory index  Normal.   ACL/PCL tests  Lachman test: stable  Anterior drawer: stable  Posterior drawer: stable   MCL/LCL tests  MCL laxity: stable  LCL laxity: stable   Patellofemoral tests  Patellar grind test: negative  Patellar apprehension: negative   Meniscal tests  Gabino's test: normal  Thessalys: Normal   Single-leg squat: Painful bilaterally mildly Hip abduction: Left side 4/5, right side 4+/5  Cervical Physical Exam   Inspection: normal   Tenderness: Mild-moderate Location upper trapezius bilaterally  Neck ROM  Flexion: normal  Extension: normal  Lateral bending: normal  Rotation: normal painful   Strength testing: Motor strength is 5/5    Neurological exam: Deep tendon reflexes are symmetrical throughout the upper extremities. Sensation is normal.   No clonus  Spurlings: Negative Hoffmans: Negative

## 2024-09-26 NOTE — DISCUSSION/SUMMARY
[de-identified] : CELESTINA ANDERSON is a 33 year old female presenting with 1 year history of bilateral anterior knee pain consistent with patellofemoral pain syndrome.    Overall improving in terms of her knee pain with recent strain of her neck bilaterally without any signs of radicular symptoms.  Overall recommend the followin.  Reassurance provided there is no signs of radiculopathy. 2.  Home exercise program given for neck strain 3.  Recommend heat and over-the-counter Tylenol and NSAIDs 4.  Patient will follow-up as needed

## 2024-09-26 NOTE — HISTORY OF PRESENT ILLNESS
[de-identified] : CELESTINA ANDERSON is a 33 year old female presenting with 1 year history of anterior bilateral knee pain.  She states she wanted to get into better shape and started working with a  1 year ago and gradually started to develop bilateral anterior knee pain mostly with exercise and activity.  She states 1 month ago this started to flareup again and she wanted to see someone about it to get more definitive treatment.  She continued with her  and they have tried scaling back a bit.  Pain is worse in the anterior part of the knees and going up and down stairs.  Denies any buckling catching locking of the knee.  Here today for further evaluation.  Interval history: Patient states she has made significant improvement with her knees with rehab and it continues to improve.  She states she is here today for a different concern with a strain in the right side of the neck that has not been improving and is now radiating to the left side of the neck as well.  Denies any numbness tingling burning down the arms.

## 2024-11-20 ENCOUNTER — TRANSCRIPTION ENCOUNTER (OUTPATIENT)
Age: 34
End: 2024-11-20

## 2024-11-20 DIAGNOSIS — M25.519 PAIN IN UNSPECIFIED SHOULDER: ICD-10-CM

## 2024-11-20 DIAGNOSIS — M54.2 CERVICALGIA: ICD-10-CM

## 2024-12-03 ENCOUNTER — APPOINTMENT (OUTPATIENT)
Dept: ORTHOPEDIC SURGERY | Facility: CLINIC | Age: 34
End: 2024-12-03

## 2024-12-04 ENCOUNTER — TRANSCRIPTION ENCOUNTER (OUTPATIENT)
Age: 34
End: 2024-12-04

## 2024-12-17 ENCOUNTER — APPOINTMENT (OUTPATIENT)
Age: 34
End: 2024-12-17
Payer: COMMERCIAL

## 2024-12-17 VITALS
WEIGHT: 124 LBS | SYSTOLIC BLOOD PRESSURE: 107 MMHG | BODY MASS INDEX: 23.41 KG/M2 | HEIGHT: 61 IN | DIASTOLIC BLOOD PRESSURE: 63 MMHG | HEART RATE: 58 BPM | OXYGEN SATURATION: 100 %

## 2024-12-17 DIAGNOSIS — M79.18 MYALGIA, OTHER SITE: ICD-10-CM

## 2024-12-17 DIAGNOSIS — S16.1XXA STRAIN OF MUSCLE, FASCIA AND TENDON AT NECK LEVEL, INITIAL ENCOUNTER: ICD-10-CM

## 2024-12-17 DIAGNOSIS — M25.511 PAIN IN RIGHT SHOULDER: ICD-10-CM

## 2024-12-17 PROCEDURE — 99214 OFFICE O/P EST MOD 30 MIN: CPT

## 2024-12-20 ENCOUNTER — TRANSCRIPTION ENCOUNTER (OUTPATIENT)
Age: 34
End: 2024-12-20

## 2024-12-23 ENCOUNTER — TRANSCRIPTION ENCOUNTER (OUTPATIENT)
Age: 34
End: 2024-12-23

## 2024-12-24 ENCOUNTER — TRANSCRIPTION ENCOUNTER (OUTPATIENT)
Age: 34
End: 2024-12-24

## 2024-12-26 ENCOUNTER — TRANSCRIPTION ENCOUNTER (OUTPATIENT)
Age: 34
End: 2024-12-26

## 2025-03-12 ENCOUNTER — TRANSCRIPTION ENCOUNTER (OUTPATIENT)
Age: 35
End: 2025-03-12

## 2025-03-17 ENCOUNTER — TRANSCRIPTION ENCOUNTER (OUTPATIENT)
Age: 35
End: 2025-03-17

## 2025-03-17 ENCOUNTER — APPOINTMENT (OUTPATIENT)
Age: 35
End: 2025-03-17

## 2025-04-11 ENCOUNTER — TRANSCRIPTION ENCOUNTER (OUTPATIENT)
Age: 35
End: 2025-04-11

## 2025-04-20 NOTE — ED ADULT TRIAGE NOTE - NS ED NURSE BANDS TYPE
[facility] EMERGENCY DEPARTMENT  EMERGENCY DEPARTMENT ENCOUNTER        Pt Name: Lexie Villa  MRN: [id number]  Birthdate 1949  Date of evaluation: 4/20/2025  Provider: ALBARO Doss  PCP: Fabiola Bueno APRN - CNP  Note Started: 1:36 PM EDT 4/20/25      OK. I have evaluated this patient.        CHIEF COMPLAINT       Chief Complaint   Patient presents with    Epistaxis     Nose bleed for 3 hours, on plavix.        HISTORY OF PRESENT ILLNESS: 1 or more Elements     History From: Patient  Limitations to history : None    Lexie Villa is a 75 y.o. female with past medical Struve chronic kidney disease, CAD, hypertension, hyperlipidemia, obstructive sleep apnea, documented atrial fibrillation on Plavix/aspirin at home who presents ED with complaint of epistaxis.  She reports for the past 3 hours she has had bleeding from the right nostril.  Patient states she is blown several large clots from the nose.  No injury or trauma.  No bleeding from the left nostril.  No posterior bleeding.  No respiratory distress.  Came to the ED for further evaluation and treatment.     Nursing Notes were all reviewed and agreed with or any disagreements were addressed in the HPI.    REVIEW OF SYSTEMS :      Review of Systems   Constitutional:  Negative for activity change, appetite change, chills, diaphoresis, fatigue and fever.   HENT:  Positive for nosebleeds. Negative for congestion, dental problem, drooling, ear discharge, ear pain, facial swelling, postnasal drip, rhinorrhea, sinus pressure, sinus pain, sore throat, trouble swallowing and voice change.    Respiratory: Negative.  Negative for cough, chest tightness and shortness of breath.    Cardiovascular: Negative.  Negative for chest pain, palpitations and leg swelling.   Gastrointestinal:  Negative for abdominal pain, constipation, diarrhea, nausea and vomiting.   Genitourinary:  Negative for decreased urine volume, difficulty urinating, 
Name band;

## 2025-05-05 ENCOUNTER — NON-APPOINTMENT (OUTPATIENT)
Age: 35
End: 2025-05-05

## 2025-05-07 ENCOUNTER — APPOINTMENT (OUTPATIENT)
Age: 35
End: 2025-05-07
Payer: COMMERCIAL

## 2025-05-07 VITALS
DIASTOLIC BLOOD PRESSURE: 69 MMHG | SYSTOLIC BLOOD PRESSURE: 104 MMHG | OXYGEN SATURATION: 98 % | HEIGHT: 61 IN | HEART RATE: 52 BPM | WEIGHT: 124 LBS | BODY MASS INDEX: 23.41 KG/M2

## 2025-05-07 DIAGNOSIS — M25.511 PAIN IN RIGHT SHOULDER: ICD-10-CM

## 2025-05-07 DIAGNOSIS — S16.1XXA STRAIN OF MUSCLE, FASCIA AND TENDON AT NECK LEVEL, INITIAL ENCOUNTER: ICD-10-CM

## 2025-05-07 DIAGNOSIS — M25.311 OTHER INSTABILITY, RIGHT SHOULDER: ICD-10-CM

## 2025-05-07 DIAGNOSIS — G25.89 OTHER SPECIFIED EXTRAPYRAMIDAL AND MOVEMENT DISORDERS: ICD-10-CM

## 2025-05-07 DIAGNOSIS — M79.18 MYALGIA, OTHER SITE: ICD-10-CM

## 2025-05-07 PROCEDURE — 99214 OFFICE O/P EST MOD 30 MIN: CPT

## 2025-05-07 RX ORDER — CYCLOBENZAPRINE HYDROCHLORIDE 5 MG/1
5 TABLET, FILM COATED ORAL
Qty: 21 | Refills: 1 | Status: ACTIVE | COMMUNITY
Start: 2025-05-07 | End: 1900-01-01

## 2025-05-22 ENCOUNTER — APPOINTMENT (OUTPATIENT)
Age: 35
End: 2025-05-22
Payer: COMMERCIAL

## 2025-05-22 VITALS
OXYGEN SATURATION: 97 % | DIASTOLIC BLOOD PRESSURE: 74 MMHG | HEIGHT: 61 IN | HEART RATE: 85 BPM | BODY MASS INDEX: 22.47 KG/M2 | WEIGHT: 119 LBS | SYSTOLIC BLOOD PRESSURE: 110 MMHG

## 2025-05-22 DIAGNOSIS — M25.511 PAIN IN RIGHT SHOULDER: ICD-10-CM

## 2025-05-22 DIAGNOSIS — M25.311 OTHER INSTABILITY, RIGHT SHOULDER: ICD-10-CM

## 2025-05-22 PROCEDURE — 20552 NJX 1/MLT TRIGGER POINT 1/2: CPT

## 2025-05-22 PROCEDURE — 76942 ECHO GUIDE FOR BIOPSY: CPT

## 2025-06-17 ENCOUNTER — TRANSCRIPTION ENCOUNTER (OUTPATIENT)
Age: 35
End: 2025-06-17

## 2025-06-18 ENCOUNTER — TRANSCRIPTION ENCOUNTER (OUTPATIENT)
Age: 35
End: 2025-06-18

## 2025-07-28 ENCOUNTER — TRANSCRIPTION ENCOUNTER (OUTPATIENT)
Age: 35
End: 2025-07-28

## 2025-07-31 ENCOUNTER — APPOINTMENT (OUTPATIENT)
Age: 35
End: 2025-07-31
Payer: COMMERCIAL

## 2025-07-31 VITALS
SYSTOLIC BLOOD PRESSURE: 110 MMHG | BODY MASS INDEX: 23.22 KG/M2 | WEIGHT: 123 LBS | DIASTOLIC BLOOD PRESSURE: 70 MMHG | HEART RATE: 70 BPM | OXYGEN SATURATION: 99 % | HEIGHT: 61 IN

## 2025-07-31 DIAGNOSIS — M25.512 PAIN IN LEFT SHOULDER: ICD-10-CM

## 2025-07-31 PROCEDURE — 76942 ECHO GUIDE FOR BIOPSY: CPT

## 2025-07-31 PROCEDURE — 20552 NJX 1/MLT TRIGGER POINT 1/2: CPT

## 2025-08-19 ENCOUNTER — NON-APPOINTMENT (OUTPATIENT)
Age: 35
End: 2025-08-19

## 2025-08-19 ENCOUNTER — EMERGENCY (EMERGENCY)
Facility: HOSPITAL | Age: 35
LOS: 1 days | End: 2025-08-19

## 2025-08-19 PROCEDURE — L9992: CPT

## (undated) DEVICE — SYR LUER LOK 50CC

## (undated) DEVICE — SYR LUER LOK 20CC

## (undated) DEVICE — TUBING SUCTION CONN 6FT STERILE

## (undated) DEVICE — FOLEY HOLDER STATLOCK 2 WAY ADULT

## (undated) DEVICE — SUCTION YANKAUER NO CONTROL VENT

## (undated) DEVICE — SYR LUER LOK 5CC